# Patient Record
Sex: FEMALE | Race: BLACK OR AFRICAN AMERICAN | NOT HISPANIC OR LATINO | Employment: OTHER | URBAN - METROPOLITAN AREA
[De-identification: names, ages, dates, MRNs, and addresses within clinical notes are randomized per-mention and may not be internally consistent; named-entity substitution may affect disease eponyms.]

---

## 2023-09-06 ENCOUNTER — HOSPITAL ENCOUNTER (OUTPATIENT)
Dept: DATA CONVERSION | Facility: HOSPITAL | Age: 68
Discharge: HOME | End: 2023-09-06
Payer: MEDICARE

## 2023-09-06 DIAGNOSIS — Z12.31 ENCOUNTER FOR SCREENING MAMMOGRAM FOR MALIGNANT NEOPLASM OF BREAST: ICD-10-CM

## 2023-09-06 DIAGNOSIS — N95.9 UNSPECIFIED MENOPAUSAL AND PERIMENOPAUSAL DISORDER: ICD-10-CM

## 2023-09-06 DIAGNOSIS — E03.9 HYPOTHYROIDISM, UNSPECIFIED: ICD-10-CM

## 2023-09-06 DIAGNOSIS — E55.9 VITAMIN D DEFICIENCY, UNSPECIFIED: ICD-10-CM

## 2023-09-06 DIAGNOSIS — R73.9 HYPERGLYCEMIA, UNSPECIFIED: ICD-10-CM

## 2023-09-06 DIAGNOSIS — Z01.89 ENCOUNTER FOR OTHER SPECIFIED SPECIAL EXAMINATIONS: ICD-10-CM

## 2023-09-06 DIAGNOSIS — E78.00 PURE HYPERCHOLESTEROLEMIA, UNSPECIFIED: ICD-10-CM

## 2023-09-06 DIAGNOSIS — Z11.59 ENCOUNTER FOR SCREENING FOR OTHER VIRAL DISEASES: ICD-10-CM

## 2023-09-06 DIAGNOSIS — R39.9 UNSPECIFIED SYMPTOMS AND SIGNS INVOLVING THE GENITOURINARY SYSTEM: ICD-10-CM

## 2023-09-06 LAB
25(OH)D3 SERPL-MCNC: 39 NG/ML (ref 31–100)
ALBUMIN SERPL-MCNC: 4.2 GM/DL (ref 3.5–5)
ALBUMIN/GLOB SERPL: 1.8 RATIO (ref 1.5–3)
ALP BLD-CCNC: 64 U/L (ref 35–125)
ALT SERPL-CCNC: 14 U/L (ref 5–40)
ANION GAP SERPL CALCULATED.3IONS-SCNC: 11 MMOL/L (ref 0–19)
APPEARANCE PLAS: NORMAL
AST SERPL-CCNC: 13 U/L (ref 5–40)
BACTERIA UR QL AUTO: NEGATIVE
BASOPHILS # BLD AUTO: 0.02 K/UL (ref 0–0.22)
BASOPHILS NFR BLD AUTO: 0.4 % (ref 0–1)
BILIRUB DIRECT SERPL-MCNC: 0.3 MG/DL (ref 0–0.2)
BILIRUB INDIRECT SERPL-MCNC: 1.1 MG/DL (ref 0–0.8)
BILIRUB SERPL-MCNC: 1.4 MG/DL (ref 0.1–1.2)
BILIRUB UR QL STRIP.AUTO: NEGATIVE
BUN SERPL-MCNC: 13 MG/DL (ref 8–25)
BUN/CREAT SERPL: 14.4 RATIO (ref 8–21)
CALCIUM SERPL-MCNC: 9.8 MG/DL (ref 8.5–10.4)
CHLORIDE SERPL-SCNC: 111 MMOL/L (ref 97–107)
CHOLEST SERPL-MCNC: 147 MG/DL (ref 133–200)
CHOLEST/HDLC SERPL: 2.5 RATIO
CLARITY UR: CLEAR
CO2 SERPL-SCNC: 24 MMOL/L (ref 24–31)
COLOR SPUN FLD: NORMAL
COLOR UR: YELLOW
CREAT SERPL-MCNC: 0.9 MG/DL (ref 0.4–1.6)
DEPRECATED RDW RBC AUTO: 40.1 FL (ref 37–54)
DIFFERENTIAL METHOD BLD: ABNORMAL
EOSINOPHIL # BLD AUTO: 0.03 K/UL (ref 0–0.45)
EOSINOPHIL NFR BLD: 0.6 % (ref 0–3)
ERYTHROCYTE [DISTWIDTH] IN BLOOD BY AUTOMATED COUNT: 13.2 % (ref 11.7–15)
FASTING STATUS PATIENT QL REPORTED: NORMAL
GFR SERPL CREATININE-BSD FRML MDRD: 71 ML/MIN/1.73 M2
GLOBULIN SER-MCNC: 2.3 G/DL (ref 1.9–3.7)
GLUCOSE SERPL-MCNC: 81 MG/DL (ref 65–99)
GLUCOSE UR STRIP.AUTO-MCNC: NEGATIVE MG/DL
HBA1C MFR BLD: 5.1 % (ref 4–6)
HCT VFR BLD AUTO: 35.3 % (ref 36–44)
HDLC SERPL-MCNC: 58 MG/DL
HGB BLD-MCNC: 12.5 GM/DL (ref 12–15)
HGB UR QL STRIP.AUTO: 2 /HPF (ref 0–3)
HGB UR QL: NEGATIVE
HYALINE CASTS UR QL AUTO: 3 /LPF
IMM GRANULOCYTES # BLD AUTO: 0.01 K/UL (ref 0–0.1)
KETONES UR QL STRIP.AUTO: ABNORMAL
LDLC SERPL CALC-MCNC: 75 MG/DL (ref 65–130)
LEUKOCYTE ESTERASE UR QL STRIP.AUTO: NEGATIVE
LYMPHOCYTES # BLD AUTO: 2.63 K/UL (ref 1.2–3.2)
LYMPHOCYTES NFR BLD MANUAL: 51.5 % (ref 20–40)
MCH RBC QN AUTO: 29.4 PG (ref 26–34)
MCHC RBC AUTO-ENTMCNC: 35.4 % (ref 31–37)
MCV RBC AUTO: 83.1 FL (ref 80–100)
MICROSCOPIC (UA): ABNORMAL
MONOCYTES # BLD AUTO: 0.42 K/UL (ref 0–0.8)
MONOCYTES NFR BLD MANUAL: 8.2 % (ref 0–8)
NEUTROPHILS # BLD AUTO: 2 K/UL
NEUTROPHILS # BLD AUTO: 2 K/UL (ref 1.8–7.7)
NEUTROPHILS.IMMATURE NFR BLD: 0.2 % (ref 0–1)
NEUTS SEG NFR BLD: 39.1 % (ref 50–70)
NITRITE UR QL STRIP.AUTO: NEGATIVE
NRBC BLD-RTO: 0 /100 WBC
PH UR STRIP.AUTO: 5.5 [PH] (ref 4.6–8)
PLATELET # BLD AUTO: 214 K/UL (ref 150–450)
PMV BLD AUTO: 10.2 CU (ref 7–12.6)
POTASSIUM SERPL-SCNC: 4.1 MMOL/L (ref 3.4–5.1)
PROT SERPL-MCNC: 6.5 G/DL (ref 5.9–7.9)
PROT UR STRIP.AUTO-MCNC: ABNORMAL MG/DL
RBC # BLD AUTO: 4.25 M/UL (ref 4–4.9)
SODIUM SERPL-SCNC: 146 MMOL/L (ref 133–145)
SP GR UR STRIP.AUTO: 1.03 (ref 1–1.03)
SQUAMOUS UR QL AUTO: ABNORMAL /HPF
TRIGL SERPL-MCNC: 71 MG/DL (ref 40–150)
TSH SERPL DL<=0.05 MIU/L-ACNC: 1.12 MIU/L (ref 0.27–4.2)
UROBILINOGEN UR QL STRIP.AUTO: NORMAL MG/DL (ref 0–1)
WBC # BLD AUTO: 5.1 K/UL (ref 4.5–11)
WBC #/AREA URNS AUTO: 1 /HPF (ref 0–3)

## 2023-09-07 LAB — HCV AB SER QL: NORMAL

## 2023-09-23 PROBLEM — K90.9 ABNORMAL INTESTINAL ABSORPTION (HHS-HCC): Status: ACTIVE | Noted: 2023-09-23

## 2023-09-23 PROBLEM — N95.9 UNSPECIFIED MENOPAUSAL AND PERIMENOPAUSAL DISORDER: Status: ACTIVE | Noted: 2023-09-23

## 2023-09-23 PROBLEM — R40.0 DAYTIME SOMNOLENCE: Status: ACTIVE | Noted: 2023-09-23

## 2023-09-23 PROBLEM — F41.9 ANXIETY: Status: ACTIVE | Noted: 2023-09-23

## 2023-09-23 PROBLEM — I10 BENIGN ESSENTIAL HTN: Status: ACTIVE | Noted: 2023-09-23

## 2023-09-23 PROBLEM — K57.30 DIVERTICULOSIS OF LARGE INTESTINE: Status: ACTIVE | Noted: 2023-09-23

## 2023-09-23 PROBLEM — E78.5 HYPERLIPIDEMIA: Status: ACTIVE | Noted: 2023-09-23

## 2023-09-23 PROBLEM — K29.30 CHRONIC SUPERFICIAL GASTRITIS: Status: ACTIVE | Noted: 2023-09-23

## 2023-09-23 PROBLEM — K22.2 ESOPHAGEAL STRICTURE: Status: ACTIVE | Noted: 2023-09-23

## 2023-09-23 PROBLEM — E21.3 HYPERPARATHYROIDISM (MULTI): Status: ACTIVE | Noted: 2023-09-23

## 2023-09-23 PROBLEM — G47.30 SLEEP APNEA: Status: ACTIVE | Noted: 2023-09-23

## 2023-09-23 PROBLEM — F33.1 MODERATE EPISODE OF RECURRENT MAJOR DEPRESSIVE DISORDER (MULTI): Status: ACTIVE | Noted: 2023-09-23

## 2023-09-23 PROBLEM — E66.01 MORBID (SEVERE) OBESITY DUE TO EXCESS CALORIES (MULTI): Status: ACTIVE | Noted: 2023-09-23

## 2023-09-23 PROBLEM — Z98.82 HISTORY OF BREAST IMPLANT: Status: ACTIVE | Noted: 2023-09-23

## 2023-09-23 PROBLEM — E03.9 HYPOTHYROIDISM: Status: ACTIVE | Noted: 2023-09-23

## 2023-09-23 PROBLEM — K21.9 GASTROESOPHAGEAL REFLUX DISEASE WITH HIATAL HERNIA: Status: ACTIVE | Noted: 2023-09-23

## 2023-09-23 PROBLEM — F41.8 MIXED ANXIETY DEPRESSIVE DISORDER: Status: ACTIVE | Noted: 2023-09-23

## 2023-09-23 PROBLEM — E78.00 PURE HYPERCHOLESTEROLEMIA: Status: ACTIVE | Noted: 2023-09-23

## 2023-09-23 PROBLEM — K44.9 GASTROESOPHAGEAL REFLUX DISEASE WITH HIATAL HERNIA: Status: ACTIVE | Noted: 2023-09-23

## 2023-09-23 PROBLEM — E55.9 VITAMIN D DEFICIENCY: Status: ACTIVE | Noted: 2023-09-23

## 2023-09-23 PROBLEM — Q79.1: Status: ACTIVE | Noted: 2023-09-23

## 2023-09-23 PROBLEM — K44.9 DIAPHRAGMATIC HERNIA WITHOUT OBSTRUCTION OR GANGRENE: Status: ACTIVE | Noted: 2023-09-23

## 2023-09-23 PROBLEM — R13.10 DYSPHAGIA: Status: ACTIVE | Noted: 2023-09-23

## 2023-09-23 RX ORDER — OXYCODONE HCL 5 MG/5 ML
5 SOLUTION, ORAL ORAL EVERY 6 HOURS
COMMUNITY
End: 2024-03-25 | Stop reason: WASHOUT

## 2023-09-23 RX ORDER — SEMAGLUTIDE 1.34 MG/ML
0.5 INJECTION, SOLUTION SUBCUTANEOUS
COMMUNITY
End: 2024-03-25 | Stop reason: WASHOUT

## 2023-09-23 RX ORDER — BUPROPION HYDROCHLORIDE 75 MG/1
1 TABLET ORAL 2 TIMES DAILY
COMMUNITY
End: 2024-04-05 | Stop reason: SDUPTHER

## 2023-09-23 RX ORDER — SEMAGLUTIDE 0.68 MG/ML
0.5 INJECTION, SOLUTION SUBCUTANEOUS
COMMUNITY
Start: 2023-08-29

## 2023-09-23 RX ORDER — HYDROCHLOROTHIAZIDE 12.5 MG/1
1 CAPSULE ORAL EVERY MORNING
COMMUNITY
End: 2024-03-25 | Stop reason: WASHOUT

## 2023-09-23 RX ORDER — IBUPROFEN 200 MG
3 TABLET ORAL EVERY MORNING
COMMUNITY
End: 2024-03-25 | Stop reason: WASHOUT

## 2023-09-23 RX ORDER — OMEPRAZOLE 40 MG/1
1 CAPSULE, DELAYED RELEASE ORAL
COMMUNITY
Start: 2023-07-31 | End: 2024-03-25 | Stop reason: WASHOUT

## 2023-09-23 RX ORDER — ACETAMINOPHEN 160 MG/5ML
LIQUID ORAL
COMMUNITY
End: 2024-03-25 | Stop reason: WASHOUT

## 2023-09-23 RX ORDER — ACETAMINOPHEN 500 MG
2 TABLET ORAL DAILY
COMMUNITY

## 2023-09-23 RX ORDER — BUPROPION HCL 150 MG
1 TABLET, EXTENDED RELEASE 24 HR ORAL EVERY MORNING
COMMUNITY
End: 2023-10-10 | Stop reason: SDUPTHER

## 2023-09-23 RX ORDER — PANTOPRAZOLE SODIUM 40 MG/1
1 TABLET, DELAYED RELEASE ORAL
COMMUNITY
End: 2024-03-25 | Stop reason: WASHOUT

## 2023-09-23 RX ORDER — ACETAMINOPHEN 500 MG
1 TABLET ORAL DAILY
COMMUNITY

## 2023-09-25 ENCOUNTER — HOSPITAL ENCOUNTER (OUTPATIENT)
Dept: DATA CONVERSION | Facility: HOSPITAL | Age: 68
Discharge: HOME | End: 2023-09-25
Payer: MEDICARE

## 2023-09-25 DIAGNOSIS — E21.3 HYPERPARATHYROIDISM, UNSPECIFIED (MULTI): ICD-10-CM

## 2023-09-25 DIAGNOSIS — E55.9 VITAMIN D DEFICIENCY, UNSPECIFIED: ICD-10-CM

## 2023-09-25 DIAGNOSIS — K90.9 INTESTINAL MALABSORPTION, UNSPECIFIED (HHS-HCC): ICD-10-CM

## 2023-09-25 DIAGNOSIS — E53.8 DEFICIENCY OF OTHER SPECIFIED B GROUP VITAMINS: ICD-10-CM

## 2023-09-25 LAB
25(OH)D3 SERPL-MCNC: 44 NG/ML (ref 31–100)
ALBUMIN SERPL-MCNC: 4.1 GM/DL (ref 3.5–5)
ALBUMIN/GLOB SERPL: 1.5 RATIO (ref 1.5–3)
ALP BLD-CCNC: 66 U/L (ref 35–125)
ALT SERPL-CCNC: 18 U/L (ref 5–40)
ANION GAP SERPL CALCULATED.3IONS-SCNC: 8 MMOL/L (ref 0–19)
AST SERPL-CCNC: 16 U/L (ref 5–40)
BASOPHILS # BLD AUTO: 0.02 K/UL (ref 0–0.22)
BASOPHILS NFR BLD AUTO: 0.5 % (ref 0–1)
BILIRUB SERPL-MCNC: 1.3 MG/DL (ref 0.1–1.2)
BUN SERPL-MCNC: 10 MG/DL (ref 8–25)
BUN/CREAT SERPL: 10 RATIO (ref 8–21)
CALCIUM SERPL-MCNC: 9.6 MG/DL (ref 8.5–10.4)
CHLORIDE SERPL-SCNC: 107 MMOL/L (ref 97–107)
CO2 SERPL-SCNC: 26 MMOL/L (ref 24–31)
CREAT SERPL-MCNC: 1 MG/DL (ref 0.4–1.6)
DEPRECATED RDW RBC AUTO: 40.2 FL (ref 37–54)
DIFFERENTIAL METHOD BLD: ABNORMAL
EOSINOPHIL # BLD AUTO: 0.07 K/UL (ref 0–0.45)
EOSINOPHIL NFR BLD: 1.7 % (ref 0–3)
ERYTHROCYTE [DISTWIDTH] IN BLOOD BY AUTOMATED COUNT: 13.3 % (ref 11.7–15)
FERRITIN SERPL-MCNC: 216 NG/ML (ref 13–150)
FOLATE SERPL-MCNC: 16.8 NG/ML (ref 4.2–19.9)
GFR SERPL CREATININE-BSD FRML MDRD: 62 ML/MIN/1.73 M2
GLOBULIN SER-MCNC: 2.8 G/DL (ref 1.9–3.7)
GLUCOSE SERPL-MCNC: 78 MG/DL (ref 65–99)
HCT VFR BLD AUTO: 35.4 % (ref 36–44)
HGB BLD-MCNC: 12.4 GM/DL (ref 12–15)
IMM GRANULOCYTES # BLD AUTO: 0.01 K/UL (ref 0–0.1)
IRON SATN MFR SERPL: 34.5 % (ref 12–50)
IRON SERPL-MCNC: 90 UG/DL (ref 30–160)
LYMPHOCYTES # BLD AUTO: 2.21 K/UL (ref 1.2–3.2)
LYMPHOCYTES NFR BLD MANUAL: 54.6 % (ref 20–40)
MCH RBC QN AUTO: 29 PG (ref 26–34)
MCHC RBC AUTO-ENTMCNC: 35 % (ref 31–37)
MCV RBC AUTO: 82.9 FL (ref 80–100)
MONOCYTES # BLD AUTO: 0.33 K/UL (ref 0–0.8)
MONOCYTES NFR BLD MANUAL: 8.1 % (ref 0–8)
NEUTROPHILS # BLD AUTO: 1.41 K/UL
NEUTROPHILS # BLD AUTO: 1.41 K/UL (ref 1.8–7.7)
NEUTROPHILS.IMMATURE NFR BLD: 0.2 % (ref 0–1)
NEUTS SEG NFR BLD: 34.9 % (ref 50–70)
NRBC BLD-RTO: 0 /100 WBC
PHOSPHATE SERPL-MCNC: 3.5 MG/DL (ref 2.5–4.5)
PLATELET # BLD AUTO: 209 K/UL (ref 150–450)
PMV BLD AUTO: 10.1 CU (ref 7–12.6)
POTASSIUM SERPL-SCNC: 4.1 MMOL/L (ref 3.4–5.1)
PROT SERPL-MCNC: 6.9 G/DL (ref 5.9–7.9)
RBC # BLD AUTO: 4.27 M/UL (ref 4–4.9)
SODIUM SERPL-SCNC: 141 MMOL/L (ref 133–145)
TIBC SERPL-MCNC: 261 UG/DL (ref 228–428)
VIT B12 SERPL-MCNC: 1112 PG/ML (ref 211–946)
WBC # BLD AUTO: 4.1 K/UL (ref 4.5–11)

## 2023-09-28 LAB
COPPER SERPL-MCNC: NORMAL UG/DL
ZINC SERPL-MCNC: NORMAL UG/ML

## 2023-10-02 LAB
PTH-INTACT SERPL-MCNC: NORMAL PG/ML (ref 15–65)
VIT B1 BLD-MCNC: NORMAL UG/DL

## 2023-10-10 ENCOUNTER — TELEPHONE (OUTPATIENT)
Dept: PRIMARY CARE | Facility: CLINIC | Age: 68
End: 2023-10-10
Payer: MEDICARE

## 2023-10-10 DIAGNOSIS — F41.8 ANXIETY ASSOCIATED WITH DEPRESSION: ICD-10-CM

## 2023-10-10 RX ORDER — CHOLECALCIFEROL (VITAMIN D3) 25 MCG
TABLET,CHEWABLE ORAL
COMMUNITY

## 2023-10-10 RX ORDER — BUPROPION HYDROCHLORIDE 75 MG/1
75 TABLET ORAL 2 TIMES DAILY
Qty: 180 TABLET | Refills: 1 | Status: CANCELLED | OUTPATIENT
Start: 2023-10-10 | End: 2024-01-08

## 2023-10-10 RX ORDER — CALCIUM CARBONATE 600 MG
TABLET ORAL EVERY 12 HOURS
COMMUNITY
End: 2023-12-28 | Stop reason: WASHOUT

## 2023-12-21 ENCOUNTER — LAB (OUTPATIENT)
Dept: LAB | Facility: LAB | Age: 68
End: 2023-12-21
Payer: MEDICARE

## 2023-12-21 DIAGNOSIS — E80.6 OTHER DISORDERS OF BILIRUBIN METABOLISM: Primary | ICD-10-CM

## 2023-12-21 LAB — BILIRUB SERPL-MCNC: 1.9 MG/DL (ref 0.1–1.2)

## 2023-12-21 PROCEDURE — 82247 BILIRUBIN TOTAL: CPT

## 2023-12-21 PROCEDURE — 36415 COLL VENOUS BLD VENIPUNCTURE: CPT

## 2023-12-22 DIAGNOSIS — E55.9 VITAMIN D DEFICIENCY: ICD-10-CM

## 2023-12-22 DIAGNOSIS — E21.3 HYPERPARATHYROIDISM (MULTI): Primary | ICD-10-CM

## 2023-12-22 DIAGNOSIS — K90.9 ABNORMAL INTESTINAL ABSORPTION (HHS-HCC): ICD-10-CM

## 2023-12-22 DIAGNOSIS — E53.8 VITAMIN B12 DEFICIENCY: ICD-10-CM

## 2023-12-27 ENCOUNTER — LAB (OUTPATIENT)
Dept: LAB | Facility: LAB | Age: 68
End: 2023-12-27
Payer: MEDICARE

## 2023-12-27 DIAGNOSIS — E53.8 VITAMIN B12 DEFICIENCY: ICD-10-CM

## 2023-12-27 DIAGNOSIS — E21.3 HYPERPARATHYROIDISM (MULTI): ICD-10-CM

## 2023-12-27 DIAGNOSIS — E55.9 VITAMIN D DEFICIENCY: ICD-10-CM

## 2023-12-27 DIAGNOSIS — K90.9 ABNORMAL INTESTINAL ABSORPTION (HHS-HCC): ICD-10-CM

## 2023-12-27 PROBLEM — Z98.84 H/O BARIATRIC SURGERY: Status: ACTIVE | Noted: 2023-12-27

## 2023-12-27 LAB
ALBUMIN SERPL-MCNC: 4.3 G/DL (ref 3.5–5)
ALP BLD-CCNC: 81 U/L (ref 35–125)
ALT SERPL-CCNC: 20 U/L (ref 5–40)
ANION GAP SERPL CALC-SCNC: 15 MMOL/L
AST SERPL-CCNC: 18 U/L (ref 5–40)
BASOPHILS # BLD AUTO: 0.02 X10*3/UL (ref 0–0.1)
BASOPHILS NFR BLD AUTO: 0.5 %
BILIRUB SERPL-MCNC: 1.7 MG/DL (ref 0.1–1.2)
BUN SERPL-MCNC: 7 MG/DL (ref 8–25)
CALCIUM SERPL-MCNC: 9.6 MG/DL (ref 8.5–10.4)
CHLORIDE SERPL-SCNC: 105 MMOL/L (ref 97–107)
CO2 SERPL-SCNC: 23 MMOL/L (ref 24–31)
CREAT SERPL-MCNC: 0.9 MG/DL (ref 0.4–1.6)
EOSINOPHIL # BLD AUTO: 0.06 X10*3/UL (ref 0–0.7)
EOSINOPHIL NFR BLD AUTO: 1.5 %
ERYTHROCYTE [DISTWIDTH] IN BLOOD BY AUTOMATED COUNT: 13.5 % (ref 11.5–14.5)
FERRITIN SERPL-MCNC: 230 NG/ML (ref 13–150)
FOLATE SERPL-MCNC: >20 NG/ML (ref 4.2–19.9)
GFR SERPL CREATININE-BSD FRML MDRD: 71 ML/MIN/1.73M*2
GLUCOSE SERPL-MCNC: 75 MG/DL (ref 65–99)
HCT VFR BLD AUTO: 36.1 % (ref 36–46)
HGB BLD-MCNC: 12.5 G/DL (ref 12–16)
IMM GRANULOCYTES # BLD AUTO: 0.01 X10*3/UL (ref 0–0.7)
IMM GRANULOCYTES NFR BLD AUTO: 0.3 % (ref 0–0.9)
IRON SATN MFR SERPL: 34 % (ref 12–50)
IRON SERPL-MCNC: 93 UG/DL (ref 30–160)
LYMPHOCYTES # BLD AUTO: 2.25 X10*3/UL (ref 1.2–4.8)
LYMPHOCYTES NFR BLD AUTO: 57.3 %
MCH RBC QN AUTO: 28.2 PG (ref 26–34)
MCHC RBC AUTO-ENTMCNC: 34.6 G/DL (ref 32–36)
MCV RBC AUTO: 82 FL (ref 80–100)
MONOCYTES # BLD AUTO: 0.29 X10*3/UL (ref 0.1–1)
MONOCYTES NFR BLD AUTO: 7.4 %
NEUTROPHILS # BLD AUTO: 1.3 X10*3/UL (ref 1.2–7.7)
NEUTROPHILS NFR BLD AUTO: 33 %
NRBC BLD-RTO: 0 /100 WBCS (ref 0–0)
PLATELET # BLD AUTO: 197 X10*3/UL (ref 150–450)
POTASSIUM SERPL-SCNC: 4.5 MMOL/L (ref 3.4–5.1)
PROT SERPL-MCNC: 6.5 G/DL (ref 5.9–7.9)
RBC # BLD AUTO: 4.43 X10*6/UL (ref 4–5.2)
SODIUM SERPL-SCNC: 143 MMOL/L (ref 133–145)
TIBC SERPL-MCNC: 275 UG/DL (ref 228–428)
UIBC SERPL-MCNC: 182 UG/DL (ref 110–370)
VIT B12 SERPL-MCNC: 664 PG/ML (ref 211–946)
WBC # BLD AUTO: 3.9 X10*3/UL (ref 4.4–11.3)

## 2023-12-27 PROCEDURE — 82525 ASSAY OF COPPER: CPT

## 2023-12-27 PROCEDURE — 83550 IRON BINDING TEST: CPT

## 2023-12-27 PROCEDURE — 85025 COMPLETE CBC W/AUTO DIFF WBC: CPT

## 2023-12-27 PROCEDURE — 82652 VIT D 1 25-DIHYDROXY: CPT

## 2023-12-27 PROCEDURE — 82607 VITAMIN B-12: CPT

## 2023-12-27 PROCEDURE — 36415 COLL VENOUS BLD VENIPUNCTURE: CPT

## 2023-12-27 PROCEDURE — 82728 ASSAY OF FERRITIN: CPT

## 2023-12-27 PROCEDURE — 83970 ASSAY OF PARATHORMONE: CPT

## 2023-12-27 PROCEDURE — 84425 ASSAY OF VITAMIN B-1: CPT

## 2023-12-27 PROCEDURE — 84630 ASSAY OF ZINC: CPT

## 2023-12-27 PROCEDURE — 83540 ASSAY OF IRON: CPT

## 2023-12-27 PROCEDURE — 80053 COMPREHEN METABOLIC PANEL: CPT

## 2023-12-27 PROCEDURE — 82746 ASSAY OF FOLIC ACID SERUM: CPT

## 2023-12-27 NOTE — PROGRESS NOTES
6 MOS FUV RYGB/ HHR  LAB REMINDER PROVIDED/ PATIENT STATES THT SHE WILL GO ON 12/27 TO GET LABS DONE. SD  START WT: 276   IDEAL WT:  150 START EXCESS: 126 TOTAL WT LOSS: 100 EWL:   79  % HT: 65 today wt: 176 lb  Patient admits to having excessive thirst.   Bariatric Surgery F/U:          Seen at ER after surgery? No.  Hospital admission? No.  Bariatric reoperation? No.  Bariatric intervention? No.  Was anticoagulation initiated for presumed/confirmed Vein Thrombosis/PE? No.  Was an incisional hernia noted on exam? No.  Sleep Apnea? No.  Still using C-PAP? No.  GERD req. meds? No.  Hyperlipidemia? No.  Still taking Cholesterol med? No.  Hypertension? No.  Still taking HTN med? No.  Number of Anti-hypertensive Medications: 0.  Diabetes? No.  Still taking DM med? No.  Current DM medication type: _____.         CONSTITUTIONAL:          Chills No.  Fatigue No.  Fever No.         CARDIOLOGY:          Negative for dizziness, chest pain, palpitations, shortness of breath.         RESPIRATORY:          Negative for chest congestion, cough, wheezing.         GASTROENTEROLOGY:          Food Intolerance yes, cake, sweets and fried food.  Acid reflux No.  Abdominal pain yes.  Black stools No.  Constipation No.  Diarrhea No.  Loss of appetite no.  Nausea No.  Vomiting No.         UROLOGY:          Negative for dysuria, urinary urgency, kidney stones.         PSYCHOLOGY:          Negative for depression, anxiety, high stress level.

## 2023-12-27 NOTE — PROGRESS NOTES
"Subjective   Patient ID: Krystina Ortez is a 68 y.o. female who presents for Follow-up (6 fuv rygb).  ELISE Flaherty is here for her 6 month follow up from RYGB. She feels great. She has lost 79% of her excess weight. She has no reflux or dysphagia. She denies reflux. She feels full with 1 cup of food. She is excercising every day-either water aerobics or walking. She plans on starting weight training after the new year. She takes women's MVI from [employer] and calcium citrate two tablets once per day. She does not have a gallbladder.     Objective   Physical Exam  Constitutional:       Appearance: Normal appearance.   Eyes:      Pupils: Pupils are equal, round, and reactive to light.   Cardiovascular:      Rate and Rhythm: Normal rate.   Pulmonary:      Effort: Pulmonary effort is normal.   Abdominal:      Palpations: Abdomen is soft.   Musculoskeletal:         General: Normal range of motion.   Skin:     General: Skin is warm and dry.   Neurological:      General: No focal deficit present.      Mental Status: She is alert and oriented to person, place, and time.   Psychiatric:         Mood and Affect: Mood normal.       .  Visit Vitals  /78 (BP Location: Left arm, Patient Position: Sitting)   Pulse 76   Ht 1.651 m (5' 5\")   Wt 79.8 kg (176 lb) Comment: START WT: 276   IDEAL WT:  150 START EXCESS: 126 TOTAL WT LOSS: 100 EWL:   79  % HT: 65 today wt: 176 lb   BMI 29.29 kg/m²   Smoking Status Never   BSA 1.91 m²        Latest Reference Range & Units 12/27/23 16:05   GLUCOSE 65 - 99 mg/dL 75   SODIUM 133 - 145 mmol/L 143   POTASSIUM 3.4 - 5.1 mmol/L 4.5   CHLORIDE 97 - 107 mmol/L 105   Bicarbonate 24 - 31 mmol/L 23 (L)   Anion Gap <=19 mmol/L 15   Blood Urea Nitrogen 8 - 25 mg/dL 7 (L)   Creatinine 0.40 - 1.60 mg/dL 0.90   EGFR >60 mL/min/1.73m*2 71   Calcium 8.5 - 10.4 mg/dL 9.6   Albumin 3.5 - 5.0 g/dL 4.3   Alkaline Phosphatase 35 - 125 U/L 81   ALT 5 - 40 U/L 20   AST 5 - 40 U/L 18   Bilirubin Total 0.1 - 1.2 mg/dL " 1.7 (H)   FERRITIN 13 - 150 ng/mL 230 (H)   FOLATE 4.2 - 19.9 ng/mL >20.0 (H)   Total Protein 5.9 - 7.9 g/dL 6.5   IRON 30 - 160 ug/dL 93   TIBC 228 - 428 ug/dL 275   UIBC 110 - 370 ug/dL 182   % Saturation 12 - 50 % 34   Vitamin B12 211 - 946 pg/mL 664   Parathyroid Hormone, Intact 18.5 - 88.0 pg/mL 46.5   WBC 4.4 - 11.3 x10*3/uL 3.9 (L)   nRBC 0.0 - 0.0 /100 WBCs 0.0   RBC 4.00 - 5.20 x10*6/uL 4.43   HEMOGLOBIN 12.0 - 16.0 g/dL 12.5   HEMATOCRIT 36.0 - 46.0 % 36.1   MCV 80 - 100 fL 82   MCH 26.0 - 34.0 pg 28.2   MCHC 32.0 - 36.0 g/dL 34.6   RED CELL DISTRIBUTION WIDTH 11.5 - 14.5 % 13.5   Platelets 150 - 450 x10*3/uL 197   Neutrophils % 40.0 - 80.0 % 33.0   Immature Granulocytes %, Automated 0.0 - 0.9 % 0.3   Lymphocytes % 13.0 - 44.0 % 57.3   Monocytes % 2.0 - 10.0 % 7.4   Eosinophils % 0.0 - 6.0 % 1.5   Basophils % 0.0 - 2.0 % 0.5   Neutrophils Absolute 1.20 - 7.70 x10*3/uL 1.30   Immature Granulocytes Absolute, Automated 0.00 - 0.70 x10*3/uL 0.01   Lymphocytes Absolute 1.20 - 4.80 x10*3/uL 2.25   Monocytes Absolute 0.10 - 1.00 x10*3/uL 0.29   Eosinophils Absolute 0.00 - 0.70 x10*3/uL 0.06   Basophils Absolute 0.00 - 0.10 x10*3/uL 0.02       Assessment/Plan   Problem List Items Addressed This Visit             ICD-10-CM    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    Relevant Orders    CBC and Auto Differential    Comprehensive Metabolic Panel    Copper, Blood    Ferritin    Folate    Iron and TIBC    Parathyroid Hormone, Intact    Vitamin B1, Whole Blood    Vitamin B12    Vitamin D 25-Hydroxy,Total (for eval of Vitamin D levels)    Zinc, Serum or Plasma    Hyperparathyroidism (CMS/HCC) E21.3    Relevant Orders    Parathyroid Hormone, Intact    Vitamin D deficiency E55.9    Relevant Orders    Vitamin D 25-Hydroxy,Total (for eval of Vitamin D levels)    Vitamin B12 deficiency E53.8    Relevant Orders    Vitamin B12    H/O bariatric surgery Z98.84     We reviewed the rules. I encouraged Krystina to continue  exercising and monitoring portions for continued weight loss and maintenance. I reminded her that a RYGB is not compatible with ASA or NSAID use.        Gemini Shelton, SHIVA-CNP 12/28/23 10:42 AM

## 2023-12-28 ENCOUNTER — OFFICE VISIT (OUTPATIENT)
Dept: SURGERY | Facility: CLINIC | Age: 68
End: 2023-12-28
Payer: MEDICARE

## 2023-12-28 VITALS
SYSTOLIC BLOOD PRESSURE: 122 MMHG | DIASTOLIC BLOOD PRESSURE: 78 MMHG | HEIGHT: 65 IN | HEART RATE: 76 BPM | WEIGHT: 176 LBS | BODY MASS INDEX: 29.32 KG/M2

## 2023-12-28 DIAGNOSIS — E53.8 VITAMIN B12 DEFICIENCY: ICD-10-CM

## 2023-12-28 DIAGNOSIS — E55.9 VITAMIN D DEFICIENCY: ICD-10-CM

## 2023-12-28 DIAGNOSIS — Z98.84 H/O BARIATRIC SURGERY: ICD-10-CM

## 2023-12-28 DIAGNOSIS — K90.9 ABNORMAL INTESTINAL ABSORPTION (HHS-HCC): Primary | ICD-10-CM

## 2023-12-28 DIAGNOSIS — E21.3 HYPERPARATHYROIDISM (MULTI): ICD-10-CM

## 2023-12-28 LAB — PTH-INTACT SERPL-MCNC: 46.5 PG/ML (ref 18.5–88)

## 2023-12-28 PROCEDURE — 1036F TOBACCO NON-USER: CPT

## 2023-12-28 PROCEDURE — 1159F MED LIST DOCD IN RCRD: CPT

## 2023-12-28 PROCEDURE — 3078F DIAST BP <80 MM HG: CPT

## 2023-12-28 PROCEDURE — 99214 OFFICE O/P EST MOD 30 MIN: CPT

## 2023-12-28 PROCEDURE — 3074F SYST BP LT 130 MM HG: CPT

## 2023-12-28 PROCEDURE — 1126F AMNT PAIN NOTED NONE PRSNT: CPT

## 2023-12-28 PROCEDURE — 1160F RVW MEDS BY RX/DR IN RCRD: CPT

## 2023-12-28 RX ORDER — BISMUTH SUBSALICYLATE 262 MG
1 TABLET,CHEWABLE ORAL DAILY
COMMUNITY

## 2023-12-28 RX ORDER — IBUPROFEN 200 MG
1 CAPSULE ORAL DAILY
COMMUNITY

## 2023-12-28 ASSESSMENT — PAIN SCALES - GENERAL: PAINLEVEL: 0-NO PAIN

## 2023-12-28 ASSESSMENT — PATIENT HEALTH QUESTIONNAIRE - PHQ9
1. LITTLE INTEREST OR PLEASURE IN DOING THINGS: NOT AT ALL
2. FEELING DOWN, DEPRESSED OR HOPELESS: NOT AT ALL
SUM OF ALL RESPONSES TO PHQ9 QUESTIONS 1 AND 2: 0

## 2023-12-28 ASSESSMENT — LIFESTYLE VARIABLES
SKIP TO QUESTIONS 9-10: 1
AUDIT-C TOTAL SCORE: 1
HOW OFTEN DO YOU HAVE SIX OR MORE DRINKS ON ONE OCCASION: NEVER
HOW MANY STANDARD DRINKS CONTAINING ALCOHOL DO YOU HAVE ON A TYPICAL DAY: 1 OR 2
HOW OFTEN DO YOU HAVE A DRINK CONTAINING ALCOHOL: MONTHLY OR LESS

## 2023-12-28 ASSESSMENT — ENCOUNTER SYMPTOMS
OCCASIONAL FEELINGS OF UNSTEADINESS: 0
LOSS OF SENSATION IN FEET: 0
DEPRESSION: 0

## 2023-12-28 ASSESSMENT — COLUMBIA-SUICIDE SEVERITY RATING SCALE - C-SSRS
2. HAVE YOU ACTUALLY HAD ANY THOUGHTS OF KILLING YOURSELF?: NO
6. HAVE YOU EVER DONE ANYTHING, STARTED TO DO ANYTHING, OR PREPARED TO DO ANYTHING TO END YOUR LIFE?: NO
1. IN THE PAST MONTH, HAVE YOU WISHED YOU WERE DEAD OR WISHED YOU COULD GO TO SLEEP AND NOT WAKE UP?: NO

## 2023-12-29 LAB
1,25(OH)2D3 SERPL-MCNC: 53.5 PG/ML (ref 19.9–79.3)
COPPER SERPL-MCNC: 105.1 UG/DL (ref 80–155)
ZINC SERPL-MCNC: 67.4 UG/DL (ref 60–120)

## 2023-12-30 LAB — VIT B1 PYROPHOSHATE BLD-SCNC: 69 NMOL/L (ref 70–180)

## 2024-01-03 DIAGNOSIS — E51.9 THIAMINE DEFICIENCY: Primary | ICD-10-CM

## 2024-01-03 RX ORDER — LANOLIN ALCOHOL/MO/W.PET/CERES
50 CREAM (GRAM) TOPICAL DAILY
Qty: 45 TABLET | Refills: 3 | Status: SHIPPED | OUTPATIENT
Start: 2024-01-03 | End: 2024-03-25 | Stop reason: WASHOUT

## 2024-03-12 ENCOUNTER — APPOINTMENT (OUTPATIENT)
Dept: PRIMARY CARE | Facility: CLINIC | Age: 69
End: 2024-03-12
Payer: MEDICARE

## 2024-03-24 NOTE — PROGRESS NOTES
[facility]: [facility]  PROGRESS NOTE      Krystina Ortez is a 68 y.o. female that is presenting today for No chief complaint on file..    Assessment/Plan   Diagnoses and all orders for this visit:  Benign essential HTN    Under control with diet / losing weight  Continue the same   Hyperparathyroidism (CMS/HCC)    Followed by Endo  Moderate episode of recurrent major depressive disorder (CMS/HCC)     Under control with current treatment   Continue Wellbutrin the dame  Neutropenia, unspecified type (CMS/HCC)    Being monitored / Continue same  Encounter for routine laboratory testing  -     Comprehensive Metabolic Panel; Future  -     CBC and Auto Differential; Future  -     Lipid Panel; Future  -     Hemoglobin A1C; Future  -     Vitamin D 25-Hydroxy,Total (for eval of Vitamin D levels); Future  -     TSH with reflex to Free T4 if abnormal; Future  Vitamin D deficiency  -     Vitamin D 25-Hydroxy,Total (for eval of Vitamin D levels); Future  Abnormal finding of blood chemistry, unspecified  -     CBC and Auto Differential; Future  -     Lipid Panel; Future  -     Hemoglobin A1C; Future  Other fatigue  -     TSH with reflex to Free T4 if abnormal; Future  Encounter for screening mammogram for breast cancer  -     BI mammo bilateral screening tomosynthesis; Future  Other orders  -     Follow Up In Primary Care; Future  Subjective   - Patient is here today for 6 months FU on HTN / A & D/ GERD,  refills  been doing well and no complaints    - Patient denies any symptoms or concerns at this time.    - patient denies any adverse reactions to or concerns with his/her meds.      Review of Systems   All pertinent POSITIVES as noted per HPI.  All other systems have been reviewed and are NEGATIVE and /or Noncontributory to this patient current visit or complaint.    Objective   There were no vitals filed for this visit.   There is no height or weight on file to calculate BMI.  Physical Exam  Vitals and  "nursing note reviewed.   Constitutional:       Appearance: Normal appearance.   HENT:      Head: Normocephalic and atraumatic.   Neck:      Vascular: No carotid bruit.   Cardiovascular:      Rate and Rhythm: Normal rate and regular rhythm.      Pulses: Normal pulses.      Heart sounds: Normal heart sounds.   Pulmonary:      Effort: Pulmonary effort is normal.      Breath sounds: Normal breath sounds.   Abdominal:      General: Abdomen is flat. Bowel sounds are normal.      Palpations: Abdomen is soft.   Musculoskeletal:         General: No swelling. Normal range of motion.      Cervical back: Neck supple.   Lymphadenopathy:      Cervical: No cervical adenopathy.   Skin:     General: Skin is warm and dry.   Neurological:      Mental Status: She is alert.   Psychiatric:         Mood and Affect: Mood normal.       Diagnostic Results   Lab Results   Component Value Date    GLUCOSE 75 12/27/2023    CALCIUM 9.6 12/27/2023     12/27/2023    K 4.5 12/27/2023    CO2 23 (L) 12/27/2023     12/27/2023    BUN 7 (L) 12/27/2023    CREATININE 0.90 12/27/2023     Lab Results   Component Value Date    ALT 20 12/27/2023    AST 18 12/27/2023    ALKPHOS 81 12/27/2023    BILITOT 1.7 (H) 12/27/2023     Lab Results   Component Value Date    WBC 3.9 (L) 12/27/2023    HGB 12.5 12/27/2023    HCT 36.1 12/27/2023    MCV 82 12/27/2023     12/27/2023     Lab Results   Component Value Date    CHOL 147 09/06/2023    CHOL 140 09/01/2022     Lab Results   Component Value Date    HDL 58 09/06/2023    HDL 50 (L) 09/01/2022     Lab Results   Component Value Date    LDLCALC 75 09/06/2023    LDLCALC 69 09/01/2022     Lab Results   Component Value Date    TRIG 71 09/06/2023    TRIG 106 09/01/2022     No components found for: \"CHOLHDL\"  Lab Results   Component Value Date    HGBA1C 5.1 09/06/2023     Other labs not included in the list above were reviewed either before or during this encounter.    History    No past medical history on " file.  No past surgical history on file.  Family History   Problem Relation Name Age of Onset    Hypertension Mother      Other (heat stroke) Father      GI problems Father      Hypertension Sister      Lung disease Sister      Mental illness Sister      Other (nodules of vocal cords) Sister       Social History     Socioeconomic History    Marital status:      Spouse name: Not on file    Number of children: Not on file    Years of education: Not on file    Highest education level: Not on file   Occupational History    Not on file   Tobacco Use    Smoking status: Never    Smokeless tobacco: Never   Substance and Sexual Activity    Alcohol use: Yes    Drug use: Never    Sexual activity: Not on file   Other Topics Concern    Not on file   Social History Narrative    Not on file     Social Determinants of Health     Financial Resource Strain: Not on file   Food Insecurity: Not on file   Transportation Needs: Not on file   Physical Activity: Not on file   Stress: Not on file   Social Connections: Not on file   Intimate Partner Violence: Not on file   Housing Stability: Not on file     No Known Allergies  Current Outpatient Medications on File Prior to Visit   Medication Sig Dispense Refill    acetaminophen (Tylenol) 160 mg/5 mL elixir Take by mouth. As directed      acetaminophen (Tylenol) 500 mg tablet Take 2 tablets (1,000 mg) by mouth once daily.      buPROPion (Wellbutrin) 75 mg tablet Take 1 tablet (75 mg) by mouth 2 times a day.      calcium citrate (Calcitrate) 200 mg (950 mg) tablet Take 1 tablet (200 mg) by mouth once daily.      cholecalciferol (Vitamin D-3) 5,000 Units tablet Take 1 tablet (5,000 Units) by mouth once daily.      cyanocobalamin, vitamin B-12, 3,000 mcg capsule as directed Sublingual      glucos sul 2KCl/msm/chond/C/Mn (GLUCOSAMINE CHONDROITIN ORAL) Take 2 tablets by mouth once every day.      hydroCHLOROthiazide (Microzide) 12.5 mg capsule Take 1 capsule (12.5 mg) by mouth once daily  in the morning.      ibuprofen 200 mg tablet Take 3 tablets (600 mg) by mouth once daily in the morning. 4 tablets at bedtime      multivitamin tablet Take 1 tablet by mouth once daily. Woman mvi      mv,alyce,min/iron/folic acid/lut (COMPLETE MULTI ORAL) Take by mouth. As directed      omeprazole (PriLOSEC) 40 mg DR capsule Take 1 capsule (40 mg) by mouth once daily in the morning. Take before meals. 30 minutes prior      oxyCODONE (Roxicodone) 5 mg/5 mL solution Take 5 mL (5 mg) by mouth every 6 hours.      Ozempic 0.25 mg or 0.5 mg (2 mg/3 mL) pen injector Inject 0.5 mg under the skin 1 (one) time per week.      Ozempic 0.25 mg or 0.5 mg(2 mg/1.5 mL) pen injector Inject 0.5 mg under the skin 1 (one) time per week.      pantoprazole (ProtoNix) 40 mg EC tablet Take 1 tablet (40 mg) by mouth once daily in the morning. Take before meals. 30 minutes prior      pedi mv no.227/ferrous sulfate (FLINTSTONES COMPLETE, FE SULF, ORAL) as directed Orally      thiamine (Vitamin B-1) 100 mg tablet Take 0.5 tablets (50 mg) by mouth once daily. 45 tablet 3     No current facility-administered medications on file prior to visit.     Immunization History   Administered Date(s) Administered    Flu vaccine, quadrivalent, high-dose, preservative free, age 65y+ (FLUZONE) 09/12/2023    Pfizer COVID-19 vaccine, bivalent, age 12 years and older (30 mcg/0.3 mL) 10/04/2022    Pfizer Purple Cap SARS-CoV-2 03/14/2021, 04/04/2021, 11/16/2021    Pneumococcal conjugate vaccine, 20-valent (PREVNAR 20) 08/16/2022    Tdap vaccine, age 7 year and older (BOOSTRIX, ADACEL) 08/16/2022     Patient's medical history was reviewed and updated either before or during this encounter.       Satnam Martel MD

## 2024-03-25 ENCOUNTER — OFFICE VISIT (OUTPATIENT)
Dept: PRIMARY CARE | Facility: CLINIC | Age: 69
End: 2024-03-25
Payer: MEDICARE

## 2024-03-25 VITALS
HEART RATE: 68 BPM | DIASTOLIC BLOOD PRESSURE: 70 MMHG | OXYGEN SATURATION: 98 % | SYSTOLIC BLOOD PRESSURE: 100 MMHG | TEMPERATURE: 97.3 F | BODY MASS INDEX: 28.79 KG/M2 | WEIGHT: 173 LBS

## 2024-03-25 DIAGNOSIS — I10 BENIGN ESSENTIAL HTN: Primary | ICD-10-CM

## 2024-03-25 DIAGNOSIS — Z12.31 ENCOUNTER FOR SCREENING MAMMOGRAM FOR BREAST CANCER: ICD-10-CM

## 2024-03-25 DIAGNOSIS — F33.1 MODERATE EPISODE OF RECURRENT MAJOR DEPRESSIVE DISORDER (MULTI): ICD-10-CM

## 2024-03-25 DIAGNOSIS — R79.9 ABNORMAL FINDING OF BLOOD CHEMISTRY, UNSPECIFIED: ICD-10-CM

## 2024-03-25 DIAGNOSIS — D70.9 NEUTROPENIA, UNSPECIFIED TYPE (CMS-HCC): ICD-10-CM

## 2024-03-25 DIAGNOSIS — E21.3 HYPERPARATHYROIDISM (MULTI): ICD-10-CM

## 2024-03-25 DIAGNOSIS — R53.83 OTHER FATIGUE: ICD-10-CM

## 2024-03-25 DIAGNOSIS — Z01.89 ENCOUNTER FOR ROUTINE LABORATORY TESTING: ICD-10-CM

## 2024-03-25 DIAGNOSIS — E55.9 VITAMIN D DEFICIENCY: ICD-10-CM

## 2024-03-25 PROCEDURE — 3074F SYST BP LT 130 MM HG: CPT | Performed by: INTERNAL MEDICINE

## 2024-03-25 PROCEDURE — 99213 OFFICE O/P EST LOW 20 MIN: CPT | Performed by: INTERNAL MEDICINE

## 2024-03-25 PROCEDURE — 3078F DIAST BP <80 MM HG: CPT | Performed by: INTERNAL MEDICINE

## 2024-03-25 PROCEDURE — 1126F AMNT PAIN NOTED NONE PRSNT: CPT | Performed by: INTERNAL MEDICINE

## 2024-03-25 PROCEDURE — 1159F MED LIST DOCD IN RCRD: CPT | Performed by: INTERNAL MEDICINE

## 2024-03-25 PROCEDURE — 1036F TOBACCO NON-USER: CPT | Performed by: INTERNAL MEDICINE

## 2024-03-25 ASSESSMENT — PATIENT HEALTH QUESTIONNAIRE - PHQ9
SUM OF ALL RESPONSES TO PHQ9 QUESTIONS 1 AND 2: 0
1. LITTLE INTEREST OR PLEASURE IN DOING THINGS: NOT AT ALL
2. FEELING DOWN, DEPRESSED OR HOPELESS: NOT AT ALL

## 2024-03-25 ASSESSMENT — PAIN SCALES - GENERAL: PAINLEVEL: 0-NO PAIN

## 2024-03-26 PROBLEM — E66.01 MORBID (SEVERE) OBESITY DUE TO EXCESS CALORIES (MULTI): Status: RESOLVED | Noted: 2023-09-23 | Resolved: 2024-03-26

## 2024-04-05 DIAGNOSIS — F41.8 MIXED ANXIETY DEPRESSIVE DISORDER: Primary | ICD-10-CM

## 2024-04-05 RX ORDER — BUPROPION HYDROCHLORIDE 150 MG/1
TABLET ORAL
Qty: 90 TABLET | Refills: 1 | Status: SHIPPED | OUTPATIENT
Start: 2024-04-05

## 2024-06-12 DIAGNOSIS — K90.9 INTESTINAL MALABSORPTION, UNSPECIFIED TYPE (HHS-HCC): ICD-10-CM

## 2024-06-12 NOTE — PROGRESS NOTES
Subjective   Patient ID: Krystina Ortez is a 69 y.o. female who presents for No chief complaint on file..  HPI  1 YR FUV RYGB   START WT: 276  IDEAL WT: 150  START EXCESS: 126 HT: 65 IN TWL:114 EWL: 90%  VM FULL CAN'T LEAVE MESSAGE RE LABS  Review of Systems  Bariatric Surgery F/U:          Seen at ER after surgery? No.  Hospital admission? No.  Bariatric reoperation? No.  Bariatric intervention? No.  Was anticoagulation initiated for presumed/confirmed Vein Thrombosis/PE? No.  Was an incisional hernia noted on exam? No.  Sleep Apnea? UNKNOWN, WILL ASK PCP TO ORDER RETEST Still using C-PAP? No.  GERD req. meds? No.  Hyperlipidemia? No.  Still taking Cholesterol med? No.  Hypertension? No.  Still taking HTN med? No.  Number of Anti-hypertensive Medications: 0.  Diabetes? No.  Still taking DM med? No.  Current DM medication type: _____.         CONSTITUTIONAL:          Chills No.  Fatigue No.  Fever No.         CARDIOLOGY:          Negative for dizziness, chest pain, palpitations, shortness of breath.         RESPIRATORY:          Negative for chest congestion, cough, wheezing.         GASTROENTEROLOGY:          Food Intolerance No.  Acid reflux No.  Abdominal pain No.  Black stools No.  Constipation No.  Diarrhea No.  Loss of appetite no.  Nausea No.  Vomiting No.         UROLOGY:          Negative for dysuria, urinary urgency, kidney stones.         PSYCHOLOGY:          Negative for depression, anxiety, high stress level.   Objective   Physical Exam    Assessment/Plan            Sommer Glover LPN 06/12/24 12:21 PM

## 2024-06-20 ENCOUNTER — NUTRITION (OUTPATIENT)
Dept: SURGERY | Facility: CLINIC | Age: 69
End: 2024-06-20

## 2024-06-20 ENCOUNTER — LAB (OUTPATIENT)
Dept: LAB | Facility: LAB | Age: 69
End: 2024-06-20
Payer: MEDICARE

## 2024-06-20 ENCOUNTER — APPOINTMENT (OUTPATIENT)
Dept: SURGERY | Facility: CLINIC | Age: 69
End: 2024-06-20
Payer: MEDICARE

## 2024-06-20 VITALS
DIASTOLIC BLOOD PRESSURE: 71 MMHG | WEIGHT: 162 LBS | BODY MASS INDEX: 26.99 KG/M2 | HEIGHT: 65 IN | SYSTOLIC BLOOD PRESSURE: 110 MMHG | HEART RATE: 71 BPM

## 2024-06-20 DIAGNOSIS — K90.9 INTESTINAL MALABSORPTION, UNSPECIFIED TYPE (HHS-HCC): ICD-10-CM

## 2024-06-20 DIAGNOSIS — E55.9 VITAMIN D DEFICIENCY: ICD-10-CM

## 2024-06-20 DIAGNOSIS — K90.9 ABNORMAL INTESTINAL ABSORPTION (HHS-HCC): ICD-10-CM

## 2024-06-20 DIAGNOSIS — E53.8 VITAMIN B12 DEFICIENCY: ICD-10-CM

## 2024-06-20 DIAGNOSIS — K90.9 ABNORMAL INTESTINAL ABSORPTION (HHS-HCC): Primary | ICD-10-CM

## 2024-06-20 DIAGNOSIS — E21.3 HYPERPARATHYROIDISM (MULTI): ICD-10-CM

## 2024-06-20 DIAGNOSIS — Z98.84 H/O BARIATRIC SURGERY: ICD-10-CM

## 2024-06-20 LAB
25(OH)D3 SERPL-MCNC: 27 NG/ML (ref 31–100)
ALBUMIN SERPL-MCNC: 4.1 G/DL (ref 3.5–5)
ALP BLD-CCNC: 91 U/L (ref 35–125)
ALT SERPL-CCNC: 25 U/L (ref 5–40)
ANION GAP SERPL CALC-SCNC: 10 MMOL/L
AST SERPL-CCNC: 21 U/L (ref 5–40)
BASOPHILS # BLD AUTO: 0.03 X10*3/UL (ref 0–0.1)
BASOPHILS NFR BLD AUTO: 0.7 %
BILIRUB SERPL-MCNC: 1 MG/DL (ref 0.1–1.2)
BUN SERPL-MCNC: 13 MG/DL (ref 8–25)
CALCIUM SERPL-MCNC: 9.4 MG/DL (ref 8.5–10.4)
CHLORIDE SERPL-SCNC: 107 MMOL/L (ref 97–107)
CO2 SERPL-SCNC: 26 MMOL/L (ref 24–31)
CREAT SERPL-MCNC: 0.9 MG/DL (ref 0.4–1.6)
EGFRCR SERPLBLD CKD-EPI 2021: 70 ML/MIN/1.73M*2
EOSINOPHIL # BLD AUTO: 0.06 X10*3/UL (ref 0–0.7)
EOSINOPHIL NFR BLD AUTO: 1.5 %
ERYTHROCYTE [DISTWIDTH] IN BLOOD BY AUTOMATED COUNT: 13.3 % (ref 11.5–14.5)
FERRITIN SERPL-MCNC: 126 NG/ML (ref 13–150)
FOLATE SERPL-MCNC: 17.7 NG/ML (ref 4.2–19.9)
GLUCOSE SERPL-MCNC: 77 MG/DL (ref 65–99)
HCT VFR BLD AUTO: 36.5 % (ref 36–46)
HGB BLD-MCNC: 12.9 G/DL (ref 12–16)
IMM GRANULOCYTES # BLD AUTO: 0 X10*3/UL (ref 0–0.7)
IMM GRANULOCYTES NFR BLD AUTO: 0 % (ref 0–0.9)
IRON SATN MFR SERPL: 33 % (ref 12–50)
IRON SERPL-MCNC: 91 UG/DL (ref 30–160)
LYMPHOCYTES # BLD AUTO: 2.29 X10*3/UL (ref 1.2–4.8)
LYMPHOCYTES NFR BLD AUTO: 56.1 %
MCH RBC QN AUTO: 29.5 PG (ref 26–34)
MCHC RBC AUTO-ENTMCNC: 35.3 G/DL (ref 32–36)
MCV RBC AUTO: 83 FL (ref 80–100)
MONOCYTES # BLD AUTO: 0.34 X10*3/UL (ref 0.1–1)
MONOCYTES NFR BLD AUTO: 8.3 %
NEUTROPHILS # BLD AUTO: 1.36 X10*3/UL (ref 1.2–7.7)
NEUTROPHILS NFR BLD AUTO: 33.4 %
NRBC BLD-RTO: 0 /100 WBCS (ref 0–0)
PLATELET # BLD AUTO: 172 X10*3/UL (ref 150–450)
POTASSIUM SERPL-SCNC: 4.5 MMOL/L (ref 3.4–5.1)
PROT SERPL-MCNC: 6.6 G/DL (ref 5.9–7.9)
RBC # BLD AUTO: 4.38 X10*6/UL (ref 4–5.2)
SODIUM SERPL-SCNC: 143 MMOL/L (ref 133–145)
TIBC SERPL-MCNC: 274 UG/DL (ref 228–428)
UIBC SERPL-MCNC: 183 UG/DL (ref 110–370)
VIT B12 SERPL-MCNC: 388 PG/ML (ref 211–946)
WBC # BLD AUTO: 4.1 X10*3/UL (ref 4.4–11.3)

## 2024-06-20 PROCEDURE — 82306 VITAMIN D 25 HYDROXY: CPT

## 2024-06-20 PROCEDURE — 36415 COLL VENOUS BLD VENIPUNCTURE: CPT

## 2024-06-20 PROCEDURE — 83540 ASSAY OF IRON: CPT

## 2024-06-20 PROCEDURE — 84630 ASSAY OF ZINC: CPT

## 2024-06-20 PROCEDURE — 82746 ASSAY OF FOLIC ACID SERUM: CPT

## 2024-06-20 PROCEDURE — 3078F DIAST BP <80 MM HG: CPT

## 2024-06-20 PROCEDURE — 3074F SYST BP LT 130 MM HG: CPT

## 2024-06-20 PROCEDURE — 1159F MED LIST DOCD IN RCRD: CPT

## 2024-06-20 PROCEDURE — 85025 COMPLETE CBC W/AUTO DIFF WBC: CPT

## 2024-06-20 PROCEDURE — 1126F AMNT PAIN NOTED NONE PRSNT: CPT

## 2024-06-20 PROCEDURE — 99214 OFFICE O/P EST MOD 30 MIN: CPT

## 2024-06-20 PROCEDURE — 82728 ASSAY OF FERRITIN: CPT

## 2024-06-20 PROCEDURE — 83550 IRON BINDING TEST: CPT

## 2024-06-20 PROCEDURE — 84425 ASSAY OF VITAMIN B-1: CPT

## 2024-06-20 PROCEDURE — 82607 VITAMIN B-12: CPT

## 2024-06-20 PROCEDURE — 82525 ASSAY OF COPPER: CPT

## 2024-06-20 PROCEDURE — 80053 COMPREHEN METABOLIC PANEL: CPT

## 2024-06-20 PROCEDURE — 83970 ASSAY OF PARATHORMONE: CPT

## 2024-06-20 RX ORDER — NAPROXEN 500 MG/1
500 TABLET ORAL
COMMUNITY

## 2024-06-20 ASSESSMENT — PAIN SCALES - GENERAL: PAINLEVEL: 0-NO PAIN

## 2024-06-20 NOTE — PROGRESS NOTES
Subjective   Patient ID: Krystina Ortez is a 69 y.o. female who presents for Follow-up (1 YR FUV RYGB).  ELISE Flaherty is here for her 1 year follow up from RYGB. She feels great. She has lost 90% of her excess weight. She has no reflux. She tells me that she eats 4-5 small meals throughout the day. She is not really doing anything for exercise. She does yard work in the summer. She takes women's MVI from [employer] and calcium citrate two tablets once per day. She has recently started a liquid collagen supplement. She will get her labwork after her appointment.       Objective   Physical Exam  Constitutional:       Appearance: Normal appearance.   Eyes:      Pupils: Pupils are equal, round, and reactive to light.   Cardiovascular:      Rate and Rhythm: Normal rate.   Pulmonary:      Effort: Pulmonary effort is normal.   Abdominal:      Palpations: Abdomen is soft.   Musculoskeletal:         General: Normal range of motion.   Skin:     General: Skin is warm and dry.   Neurological:      General: No focal deficit present.      Mental Status: She is alert and oriented to person, place, and time.   Psychiatric:         Mood and Affect: Mood normal.        Latest Reference Range & Units 06/20/24 10:45   GLUCOSE 65 - 99 mg/dL 77   SODIUM 133 - 145 mmol/L 143   POTASSIUM 3.4 - 5.1 mmol/L 4.5   CHLORIDE 97 - 107 mmol/L 107   Bicarbonate 24 - 31 mmol/L 26   Anion Gap <=19 mmol/L 10   Blood Urea Nitrogen 8 - 25 mg/dL 13   Creatinine 0.40 - 1.60 mg/dL 0.90   EGFR >60 mL/min/1.73m*2 70   Calcium 8.5 - 10.4 mg/dL 9.4   Albumin 3.5 - 5.0 g/dL 4.1   Alkaline Phosphatase 35 - 125 U/L 91   ALT 5 - 40 U/L 25   AST 5 - 40 U/L 21   Bilirubin Total 0.1 - 1.2 mg/dL 1.0   FERRITIN 13 - 150 ng/mL 126   FOLATE 4.2 - 19.9 ng/mL 17.7   Total Protein 5.9 - 7.9 g/dL 6.6   IRON 30 - 160 ug/dL 91   TIBC 228 - 428 ug/dL 274   UIBC 110 - 370 ug/dL 183   % Saturation 12 - 50 % 33   Vitamin B12 211 - 946 pg/mL 388   Vitamin D, 25-Hydroxy, Total 31 - 100  ng/mL 27 (L)   LEUKOCYTES (10*3/UL) IN BLOOD BY AUTOMATED COUNT, Taiwanese 4.4 - 11.3 x10*3/uL 4.1 (L)   nRBC 0.0 - 0.0 /100 WBCs 0.0   ERYTHROCYTES (10*6/UL) IN BLOOD BY AUTOMATED COUNT, [place]ese 4.00 - 5.20 x10*6/uL 4.38   HEMOGLOBIN 12.0 - 16.0 g/dL 12.9   HEMATOCRIT 36.0 - 46.0 % 36.5   MCV 80 - 100 fL 83   MCH 26.0 - 34.0 pg 29.5   MCHC 32.0 - 36.0 g/dL 35.3   RED CELL DISTRIBUTION WIDTH 11.5 - 14.5 % 13.3   PLATELETS (10*3/UL) IN BLOOD AUTOMATED COUNT, Taiwanese 150 - 450 x10*3/uL 172   NEUTROPHILS/100 LEUKOCYTES IN BLOOD BY AUTOMATED COUNT, Taiwanese 40.0 - 80.0 % 33.4   Immature Granulocytes %, Automated 0.0 - 0.9 % 0.0   Lymphocytes % 13.0 - 44.0 % 56.1   Monocytes % 2.0 - 10.0 % 8.3   Eosinophils % 0.0 - 6.0 % 1.5   Basophils % 0.0 - 2.0 % 0.7   NEUTROPHILS (10*3/UL) IN BLOOD BY AUTOMATED COUNT, Taiwanese 1.20 - 7.70 x10*3/uL 1.36   Immature Granulocytes Absolute, Automated 0.00 - 0.70 x10*3/uL 0.00   Lymphocytes Absolute 1.20 - 4.80 x10*3/uL 2.29   Monocytes Absolute 0.10 - 1.00 x10*3/uL 0.34   Eosinophils Absolute 0.00 - 0.70 x10*3/uL 0.06   Basophils Absolute 0.00 - 0.10 x10*3/uL 0.03     Assessment/Plan   Problem List Items Addressed This Visit             ICD-10-CM    Abnormal intestinal absorption (HHS-HCC) - Primary K90.9    Relevant Orders    CBC and Auto Differential    Comprehensive Metabolic Panel    Copper, Blood    Ferritin    Folate    Iron and TIBC    Parathyroid Hormone, Intact    Vitamin B1, Whole Blood    Vitamin B12    Vitamin D 25-Hydroxy,Total (for eval of Vitamin D levels)    Zinc, Serum or Plasma    Hyperparathyroidism (Multi) E21.3    Relevant Orders    Parathyroid Hormone, Intact    Vitamin D deficiency E55.9    Relevant Orders    Vitamin D 25-Hydroxy,Total (for eval of Vitamin D levels)    Vitamin B12 deficiency E53.8    Relevant Orders    Vitamin B12    H/O bariatric surgery Z98.84     Krystina is doing well with her weigh loss. I reminded her that a RYGB is not compatible with ASA or  NSAID use. She will continue her current supplementation. I will call her with any changes in her supplementation when her lab results come back. We will recheck her labwork at her next follow up in 6 months.        SHIVA Rider-CNP 06/20/24 9:13 AM

## 2024-06-20 NOTE — PROGRESS NOTES
Bariatric Post-Op Follow Up Appointment:    Starting Weight: 276 lbs  Current Weight: 162 lbs   Current BMI: 26.96   Percentage of weight loss achieved: 90% EWL at 1 year post op     Dietary Intake:  Breakfast- protein powder in coffee (22g protein)   Lunch- Varies. Spinach salad with tomatoes, cucumbers and dressing.   Dinner- Having chicken breast tonight. Sunday roast beef with mashed potatoes and corn. Baked potato with hernandez bits.   Protein shake every other night (30g protein)   Volume of food at a time: 1/2 cup   Snacks: small portions throughout the day   Beverages: green tea, coffee in the morning    Alcohol Intake: 1-2 glasses of wine per month   Do you drink with your meals?  Current Exercise: Was doing water aerobics at the [facility]. Goes to [employer] but it is not consistent - once a wk or less. This past Saturday did a [employer] classes. Would like to get in a planned routine.   Vitamins/minerals: Womens one a day MVI with iron, Citracal two tablets - not always consistent about taking, liquid collagen supplement. Is not taking B12 or vitamin D. Did not get blood work done yet.   Food intolerances? None reported   Any decrease in energy levels? No , energy levels are great   Any questions or concerns? No     Jenifer Meredith, MS, RD, LD

## 2024-06-21 LAB — PTH-INTACT SERPL-MCNC: 50.8 PG/ML (ref 18.5–88)

## 2024-06-23 LAB
COPPER SERPL-MCNC: 101.9 UG/DL (ref 80–155)
ZINC SERPL-MCNC: 68.7 UG/DL (ref 60–120)

## 2024-06-25 ENCOUNTER — TELEPHONE (OUTPATIENT)
Dept: PRIMARY CARE | Facility: CLINIC | Age: 69
End: 2024-06-25
Payer: MEDICARE

## 2024-06-25 LAB — VIT B1 PYROPHOSHATE BLD-SCNC: 85 NMOL/L (ref 70–180)

## 2024-06-25 NOTE — TELEPHONE ENCOUNTER
Pt states she gets a bill every month for her CPAP machine.  States she is not even using her CPAP anymore because she lost weight and no longer believes she needs it.      Pt asking if you could order a sleep study to prove she does not need CPAP machine anymore.  Please advise.  Ph: [phone]

## 2024-06-25 NOTE — TELEPHONE ENCOUNTER
Looks like the CPAP was coordinated through Dr. Saleh/Dr. Jacobs's office.  She has had the machine since February 2023.

## 2024-06-26 ENCOUNTER — DOCUMENTATION (OUTPATIENT)
Dept: SURGERY | Facility: CLINIC | Age: 69
End: 2024-06-26
Payer: MEDICARE

## 2024-06-26 DIAGNOSIS — G47.33 OSA ON CPAP: Primary | ICD-10-CM

## 2024-06-26 NOTE — PROGRESS NOTES
Krystina will add and over the counter B12 and D3 supplement to her MVI. We will recheck her labwork at her next follow up.

## 2024-06-26 NOTE — TELEPHONE ENCOUNTER
Dr Jordy Thompson's office states after surgical procedure, pt is required to follow up with PCP.  In order to return CPAP machine, pt just needs a new sleep study and asking if you can order this.  Please advise.  Ph: [phone] option 3, Sommer

## 2024-07-30 ENCOUNTER — OFFICE VISIT (OUTPATIENT)
Dept: SLEEP MEDICINE | Facility: CLINIC | Age: 69
End: 2024-07-30
Payer: MEDICARE

## 2024-07-30 ENCOUNTER — APPOINTMENT (OUTPATIENT)
Dept: SLEEP MEDICINE | Facility: CLINIC | Age: 69
End: 2024-07-30
Payer: MEDICARE

## 2024-07-30 VITALS
OXYGEN SATURATION: 99 % | SYSTOLIC BLOOD PRESSURE: 86 MMHG | HEART RATE: 79 BPM | HEIGHT: 67 IN | WEIGHT: 165 LBS | BODY MASS INDEX: 25.9 KG/M2 | DIASTOLIC BLOOD PRESSURE: 62 MMHG

## 2024-07-30 DIAGNOSIS — Z98.84 H/O BARIATRIC SURGERY: ICD-10-CM

## 2024-07-30 DIAGNOSIS — G47.33 OBSTRUCTIVE SLEEP APNEA SYNDROME: Primary | ICD-10-CM

## 2024-07-30 PROBLEM — R40.0 DAYTIME SOMNOLENCE: Status: RESOLVED | Noted: 2023-09-23 | Resolved: 2024-07-30

## 2024-07-30 PROBLEM — I10 BENIGN ESSENTIAL HTN: Status: RESOLVED | Noted: 2023-09-23 | Resolved: 2024-07-30

## 2024-07-30 PROCEDURE — 3008F BODY MASS INDEX DOCD: CPT | Performed by: PHYSICIAN ASSISTANT

## 2024-07-30 PROCEDURE — 1160F RVW MEDS BY RX/DR IN RCRD: CPT | Performed by: PHYSICIAN ASSISTANT

## 2024-07-30 PROCEDURE — 99203 OFFICE O/P NEW LOW 30 MIN: CPT | Performed by: PHYSICIAN ASSISTANT

## 2024-07-30 PROCEDURE — 1126F AMNT PAIN NOTED NONE PRSNT: CPT | Performed by: PHYSICIAN ASSISTANT

## 2024-07-30 PROCEDURE — 1036F TOBACCO NON-USER: CPT | Performed by: PHYSICIAN ASSISTANT

## 2024-07-30 PROCEDURE — 1159F MED LIST DOCD IN RCRD: CPT | Performed by: PHYSICIAN ASSISTANT

## 2024-07-30 ASSESSMENT — PAIN SCALES - GENERAL: PAINLEVEL: 0-NO PAIN

## 2024-07-30 NOTE — PATIENT INSTRUCTIONS
Thank you for coming to the Sleep Medicine Clinic today! Your sleep medicine provider today was: Nilesh Beck PA-C Below is a summary of your treatment plan, other important information, and our contact numbers:      TREATMENT PLAN     Call [phone]), option 3 to schedule your sleep study. When you have an appointment please call us back at [phone] to schedule a followup appointment 3-4 weeks after to review results.    Obstructive Sleep Apnea (ELÍAS) is a sleep disorder where your upper airway muscles relax during sleep and the airway intermittently and repetitively narrows and collapses leading to partially blocked airway (hypopnea) or completely blocked airway (apnea) which, in turn, can disrupt breathing in sleep, lower oxygen levels while you sleep and cause night time wakings. Because both apnea and hypopnea may cause higher carbon dioxide or low oxygen levels, untreated ELÍAS can lead to heart arrhythmia, elevation of blood pressure, and make it harder for the body to consolidate memory and facilitate metabolism (leading to higher blood sugars at night). Frequent partial arousals occur during sleep resulting in sleep deprivation and daytime sleepiness. ELÍAS is associated with an increased risk of cardiovascular disease, stroke, hypertension, and insulin resistance. Moreover, untreated ELÍAS with excessive daytime sleepiness can increase the risk of motor vehicular accidents.    Some conservative strategies for ELÍAS regardless of ELÍAS severity are:   Positional therapy - Avoid sleeping on your back.   Healthy diet and regular exercise to optimize weight is highly encouraged.   Avoid alcohol late in the evening and sedative-hypnotics as these substances can make sleep apnea worse.   Improve breathing through the nose with intranasal steroid spray, saline rinse, or antihistamines    Safety: Avoid driving vehicle and operating heavy equipment while sleepy. Drowsy driving may lead to life-threatening  motor vehicle accidents. A person driving while sleepy is 5 times more likely to have an accident. If you feel sleepy, pull over and take a short power nap (sleep for less than 30 minutes). Otherwise, ask somebody to drive you.    Treatment options for sleep apnea include weight management, positional therapy, Positive Airway Therapy (PAP) therapy, oral appliance therapy, hypoglossal nerve stimulator (Inspire) and select airway surgeries.      OUR SLEEP TESTING LOCATIONS     Our team will contact you to schedule your sleep study, however, you can contact us as follow:  Main Phone Line (scheduling only): [phone]), option 3  Adult and Pediatric Locations  [facility] (6 years and older): [facility] - 4th floor ([address]) After hours line: [phone]  [facility] (Main campus: All ages): [facility], 6th floor. After hours line: [phone]   [place] (18 years and older): [address] [facility], 2nd floor   [place] (18 years and older): [address] [facility]; 4th floor  After hours line: [phone]  [facility] (18 years and older) at [place]: [address] [facility]  After hours line: [phone]    [place] (5 years and older; younger considered on case-by-case basis): [address] [facility]; [address], [address]. Scheduling  After hours line: [phone]   [place] (6 years and older): [address]; [address]   [place] (6 years and older): [address] [facility], [address]  After hours line: [phone]   [place] (13 years and older) in [place]: [address], 2nd floor  After hours line: [phone]   [place] (13 year and older): [address]  After hours line: [phone]      IMPORTANT PHONE NUMBERS     Sleep Medicine Clinic Fax: [phone]  Appointments (for Adult Sleep Clinic): [phone] - option 2  Appointments (For Sleep Studies):  [phone] - option 3  Behavioral Sleep Medicine: [phone]    [facility] ([facility]): [phone]  For clinical questions and refilling prescriptions: [phone]  Anastasiia Benjamin (For Carrie/Ebony): P: [phone]  F: [phone]       CONTACTING YOUR SLEEP MEDICINE PROVIDER     Send a message directly to your provider through \"My Chart\", which is the email service through your  Records Account: [email or web address]   Call [phone] and leave a message. One of the administrative assistants will forward the message to your sleep medicine provider through \"My Chart\" and/or email.     Your sleep medicine provider for this visit was: Nilesh Beck PA-C  "

## 2024-07-30 NOTE — PROGRESS NOTES
"[facility]  New Visit Note        HISTORY OF PRESENT ILLNESS     The patient's referring provider is: No ref. provider found    HISTORY OF PRESENT ILLNESS   Krystina Ortez is a 69 y.o. female who presents to a [facility] for a sleep medicine evaluation with concerns of Sleep Apnea, Pap Adherence Followup, Pap Intolerance, and Sleep Study.     PAST SLEEP HISTORY    Patient has the following sleep-related diagnoses and sleep study results: She had a home sleep study in 2023 that was positive for sleep apnea.  She is not sure of severity and results not available today.    CURRENT HISTORY    On today's visit, the patient reports she underwent bariatric surgery in summer 2023 and her weight is down about 100 pounds from her peak weight.  She is wondering if she still needs CPAP therapy after her weight loss.    She is currently sleeping well without CPAP.  She denies any daytime sleepiness, denies poor sleep quality, not sure if she snores anymore.  She wakes up about 1-2 times per night.  This is minimally bothersome.    She does not have hypertension.    STOP  0  BANG 1 A    Sleep schedule  on weekdays / work days:  Usual Bedtime  12 a  Falls asleep around  1215 am  Wake time  6 a    Sleep schedule  on weekends/non work days :  Usual Bedtime  12 a  Falls asleep around 1230 a  Wake time  7 a    Naps:   no    Average sleep duration 6 hours/day    Preferred sleeping position: side    Sleep-related ROS:    Sleep Initiation: no problems going to sleep    Sleep Maintenance: wakes 1-2 times per night    Recreational drug use  Smoking: never  Alcohol consumption: 1-2/mo  Caffeine consumption:  1/day  Marijuana: no    ESS: 5      PHYSICAL EXAM     VITAL SIGNS: BP 86/62   Pulse 79   Ht 1.702 m (5' 7\")   Wt 74.8 kg (165 lb)   SpO2 99%   BMI 25.84 kg/m²      PREVIOUS WEIGHTS:  Wt Readings from Last 3 Encounters:   07/30/24 74.8 kg (165 lb)   06/20/24 73.5 kg " (162 lb)   03/25/24 78.5 kg (173 lb)       PHYSICAL EXAM: GENERAL: alert oriented x 3 pleasant and cooperative no acute distress  MODIFIED MALLAMPATI SCORE: 2+  LATERAL PHARYNGEAL WALL: 2+  NECK EXAM: normal supple no adenopathy    RESULTS/DATA     Iron   Date Value   06/20/2024 91 ug/dL   12/27/2023 93 ug/dL   09/25/2023 90 UG/DL   01/04/2023 69 UG/DL     % Saturation (%)   Date Value   06/20/2024 33   12/27/2023 34     Iron Saturation (%)   Date Value   09/25/2023 34.5   01/04/2023 22.7     TIBC   Date Value   06/20/2024 274 ug/dL   12/27/2023 275 ug/dL   09/25/2023 261 UG/DL   01/04/2023 304 UG/DL     Ferritin   Date Value   06/20/2024 126 ng/mL   12/27/2023 230 ng/mL (H)   09/25/2023 216 NG/ML (H)   01/04/2023 104 NG/ML       ASSESSMENT/PLAN     Ms. Ortez is a 69 y.o. female and was referred to the [facility] for the following issues:    OBSTRUCTIVE SLEEP APNEA / BARIATRIC SURGERY  -Ordering sleep study to evaluate  -Strongly suspect she no longer has ELÍAS and needs treatment - HSAT to verify  -Weight down ~100 lbs since last sleep study which was positive for ELÍAS    Followup 3 weeks after sleep study to review results - may call if negative

## 2024-08-02 ENCOUNTER — PROCEDURE VISIT (OUTPATIENT)
Dept: SLEEP MEDICINE | Facility: HOSPITAL | Age: 69
End: 2024-08-02
Payer: MEDICARE

## 2024-08-02 DIAGNOSIS — G47.33 OBSTRUCTIVE SLEEP APNEA SYNDROME: ICD-10-CM

## 2024-08-02 PROCEDURE — 95806 SLEEP STUDY UNATT&RESP EFFT: CPT | Performed by: INTERNAL MEDICINE

## 2024-08-02 NOTE — LETTER
August 22, 2024    Krystina Ortez  [address] [address]      Dear Ms. Ortez:      We have been unable to contact you by phone regarding a follow up appointment.  Please call the office at [phone] so we can assist you in getting scheduled.    If you have any questions or concerns, please don't hesitate to call.    Sincerely,          Felicita Arredondo        CC: No Recipients

## 2024-08-29 NOTE — PROGRESS NOTES
[facility]  Followup Visit Note    HISTORY OF PRESENT ILLNESS   Krystina Ortez is a 69 y.o. female who presents to a [facility] for followup.       Current History    Her home sleep study on 8/2/2024 was consistent with moderate severity sleep apnea with AHI of 18.5 and spo2 <88% for 2.6 minutes.    She feels she is sleeping better since her weight loss. Doesn't think she needs CPAP anymore.    She is using a patch called X39 and thinks she is  sleeping very well now.    Previous visit 07/30/2024  OBSTRUCTIVE SLEEP APNEA / BARIATRIC SURGERY  -Ordering sleep study to evaluate  -Strongly suspect she no longer has ELÍAS and needs treatment - HSAT to verify  -Weight down ~100 lbs since last sleep study which was positive for ELÍAS    Followup 3 weeks after sleep study to review results - may call if negative  Home sleep apnea test (HSAT) [[id number]]   Procedure Visit on 8/2/24 with Ann Marie Edouard       PAP Adherence:      Sleep Scales:  ESS: No data recorded     REVIEW OF SYSTEMS    All other systems negative      PHYSICAL EXAM     VITAL SIGNS: There were no vitals taken for this visit.     PREVIOUS WEIGHTS:  Wt Readings from Last 3 Encounters:   07/30/24 74.8 kg (165 lb)   06/20/24 73.5 kg (162 lb)   03/25/24 78.5 kg (173 lb)       Constitutional: Alert and oriented, cooperative, no obvious distress   HEENT: Non icteric or anemic, EOM WNL bilaterally   Neck: Supple, no JVD, no goiter, no adenopathy, no rigidity  Extremities: No clubbing, no LL edema   Neuromuscular: Cranial nerves grossly intact, no focal deficits     RESULTS/DATA     Iron   Date Value   06/20/2024 91 ug/dL   12/27/2023 93 ug/dL   09/25/2023 90 UG/DL   01/04/2023 69 UG/DL     % Saturation (%)   Date Value   06/20/2024 33   12/27/2023 34     Iron Saturation (%)   Date Value   09/25/2023 34.5   01/04/2023 22.7     TIBC   Date Value   06/20/2024 274 ug/dL   12/27/2023 275 ug/dL   09/25/2023 261  UG/DL   01/04/2023 304 UG/DL     Ferritin   Date Value   06/20/2024 126 ng/mL   12/27/2023 230 ng/mL (H)   09/25/2023 216 NG/ML (H)   01/04/2023 104 NG/ML         ASSESSMENT/PLAN     Ms. Ortez is a 69 y.o. female and returns in followup for the following issues:    OBSTRUCTIVE SLEEP APNEA  -try CPAP for 1 week  -if no benefits in terms of sleepiness and sleep quality she will stop  -consider re-test in 1 year for ELÍAS    Follow up as needed

## 2024-08-30 ENCOUNTER — APPOINTMENT (OUTPATIENT)
Dept: SLEEP MEDICINE | Facility: CLINIC | Age: 69
End: 2024-08-30
Payer: MEDICARE

## 2024-08-30 VITALS
DIASTOLIC BLOOD PRESSURE: 65 MMHG | HEIGHT: 67 IN | OXYGEN SATURATION: 100 % | WEIGHT: 175 LBS | HEART RATE: 74 BPM | SYSTOLIC BLOOD PRESSURE: 97 MMHG | BODY MASS INDEX: 27.47 KG/M2

## 2024-08-30 DIAGNOSIS — Z98.84 H/O BARIATRIC SURGERY: ICD-10-CM

## 2024-08-30 DIAGNOSIS — G47.33 OBSTRUCTIVE SLEEP APNEA SYNDROME: Primary | ICD-10-CM

## 2024-08-30 PROCEDURE — 3008F BODY MASS INDEX DOCD: CPT | Performed by: PHYSICIAN ASSISTANT

## 2024-08-30 PROCEDURE — 1160F RVW MEDS BY RX/DR IN RCRD: CPT | Performed by: PHYSICIAN ASSISTANT

## 2024-08-30 PROCEDURE — 99213 OFFICE O/P EST LOW 20 MIN: CPT | Performed by: PHYSICIAN ASSISTANT

## 2024-08-30 PROCEDURE — 1036F TOBACCO NON-USER: CPT | Performed by: PHYSICIAN ASSISTANT

## 2024-08-30 PROCEDURE — 1126F AMNT PAIN NOTED NONE PRSNT: CPT | Performed by: PHYSICIAN ASSISTANT

## 2024-08-30 PROCEDURE — 1159F MED LIST DOCD IN RCRD: CPT | Performed by: PHYSICIAN ASSISTANT

## 2024-08-30 ASSESSMENT — LIFESTYLE VARIABLES
SKIP TO QUESTIONS 9-10: 1
AUDIT-C TOTAL SCORE: 2
HOW OFTEN DO YOU HAVE SIX OR MORE DRINKS ON ONE OCCASION: NEVER
HOW OFTEN DO YOU HAVE A DRINK CONTAINING ALCOHOL: 2-4 TIMES A MONTH
HOW MANY STANDARD DRINKS CONTAINING ALCOHOL DO YOU HAVE ON A TYPICAL DAY: 1 OR 2

## 2024-08-30 ASSESSMENT — SLEEP AND FATIGUE QUESTIONNAIRES
ESS-CHAD TOTAL SCORE: 6
HOW LIKELY ARE YOU TO NOD OFF OR FALL ASLEEP WHILE LYING DOWN TO REST IN THE AFTERNOON WHEN CIRCUMSTANCES PERMIT: SLIGHT CHANCE OF DOZING
HOW LIKELY ARE YOU TO NOD OFF OR FALL ASLEEP WHEN YOU ARE A PASSENGER IN A CAR FOR AN HOUR WITHOUT A BREAK: SLIGHT CHANCE OF DOZING
HOW LIKELY ARE YOU TO NOD OFF OR FALL ASLEEP WHILE SITTING AND READING: MODERATE CHANCE OF DOZING
HOW LIKELY ARE YOU TO NOD OFF OR FALL ASLEEP WHILE SITTING QUIETLY AFTER LUNCH WITHOUT ALCOHOL: WOULD NEVER DOZE
HOW LIKELY ARE YOU TO NOD OFF OR FALL ASLEEP IN A CAR, WHILE STOPPED FOR A FEW MINUTES IN TRAFFIC: WOULD NEVER DOZE
HOW LIKELY ARE YOU TO NOD OFF OR FALL ASLEEP WHILE SITTING AND TALKING TO SOMEONE: WOULD NEVER DOZE
HOW LIKELY ARE YOU TO NOD OFF OR FALL ASLEEP WHILE WATCHING TV: MODERATE CHANCE OF DOZING
SITING INACTIVE IN A PUBLIC PLACE LIKE A CLASS ROOM OR A MOVIE THEATER: WOULD NEVER DOZE

## 2024-08-30 ASSESSMENT — PAIN SCALES - GENERAL: PAINLEVEL: 0-NO PAIN

## 2024-08-30 NOTE — PATIENT INSTRUCTIONS
Good seeing you today,    I do recommend you try CPAP for 1 week and see if he notes any benefits.    If you do not it would be reasonable to return the machine.     Let me know how you feel back on CPAP    Nilesh Beck PA-C    IMPORTANT PHONE NUMBERS     [phone]  [employer]): [phone]  For clinical questions and refilling prescriptions: [phone]  Anastasiia Benjamin (For Carrie/Ebony): P: [phone]

## 2024-09-06 ENCOUNTER — LAB (OUTPATIENT)
Dept: LAB | Facility: LAB | Age: 69
End: 2024-09-06
Payer: MEDICARE

## 2024-09-06 DIAGNOSIS — E55.9 VITAMIN D DEFICIENCY: ICD-10-CM

## 2024-09-06 DIAGNOSIS — R79.9 ABNORMAL FINDING OF BLOOD CHEMISTRY, UNSPECIFIED: ICD-10-CM

## 2024-09-06 DIAGNOSIS — Z01.89 ENCOUNTER FOR ROUTINE LABORATORY TESTING: ICD-10-CM

## 2024-09-06 DIAGNOSIS — R53.83 OTHER FATIGUE: ICD-10-CM

## 2024-09-06 LAB
25(OH)D3 SERPL-MCNC: 36 NG/ML (ref 31–100)
ALBUMIN SERPL-MCNC: 4.1 G/DL (ref 3.5–5)
ALP BLD-CCNC: 94 U/L (ref 35–125)
ALT SERPL-CCNC: 29 U/L (ref 5–40)
ANION GAP SERPL CALC-SCNC: 9 MMOL/L
AST SERPL-CCNC: 19 U/L (ref 5–40)
BASOPHILS # BLD AUTO: 0.02 X10*3/UL (ref 0–0.1)
BASOPHILS NFR BLD AUTO: 0.5 %
BILIRUB SERPL-MCNC: 1.2 MG/DL (ref 0.1–1.2)
BUN SERPL-MCNC: 11 MG/DL (ref 8–25)
CALCIUM SERPL-MCNC: 9.3 MG/DL (ref 8.5–10.4)
CHLORIDE SERPL-SCNC: 109 MMOL/L (ref 97–107)
CHOLEST SERPL-MCNC: 154 MG/DL (ref 133–200)
CHOLEST/HDLC SERPL: 1.8 {RATIO}
CO2 SERPL-SCNC: 26 MMOL/L (ref 24–31)
CREAT SERPL-MCNC: 1 MG/DL (ref 0.4–1.6)
EGFRCR SERPLBLD CKD-EPI 2021: 62 ML/MIN/1.73M*2
EOSINOPHIL # BLD AUTO: 0.04 X10*3/UL (ref 0–0.7)
EOSINOPHIL NFR BLD AUTO: 1.1 %
ERYTHROCYTE [DISTWIDTH] IN BLOOD BY AUTOMATED COUNT: 13.3 % (ref 11.5–14.5)
EST. AVERAGE GLUCOSE BLD GHB EST-MCNC: 80 MG/DL
GLUCOSE SERPL-MCNC: 91 MG/DL (ref 65–99)
HBA1C MFR BLD: 4.4 %
HCT VFR BLD AUTO: 35.3 % (ref 36–46)
HDLC SERPL-MCNC: 87 MG/DL
HGB BLD-MCNC: 12.5 G/DL (ref 12–16)
IMM GRANULOCYTES # BLD AUTO: 0 X10*3/UL (ref 0–0.7)
IMM GRANULOCYTES NFR BLD AUTO: 0 % (ref 0–0.9)
LDLC SERPL CALC-MCNC: 50 MG/DL (ref 65–130)
LYMPHOCYTES # BLD AUTO: 1.91 X10*3/UL (ref 1.2–4.8)
LYMPHOCYTES NFR BLD AUTO: 51.5 %
MCH RBC QN AUTO: 29.6 PG (ref 26–34)
MCHC RBC AUTO-ENTMCNC: 35.4 G/DL (ref 32–36)
MCV RBC AUTO: 84 FL (ref 80–100)
MONOCYTES # BLD AUTO: 0.35 X10*3/UL (ref 0.1–1)
MONOCYTES NFR BLD AUTO: 9.4 %
NEUTROPHILS # BLD AUTO: 1.39 X10*3/UL (ref 1.2–7.7)
NEUTROPHILS NFR BLD AUTO: 37.5 %
NRBC BLD-RTO: 0 /100 WBCS (ref 0–0)
PLATELET # BLD AUTO: 217 X10*3/UL (ref 150–450)
POTASSIUM SERPL-SCNC: 4.4 MMOL/L (ref 3.4–5.1)
PROT SERPL-MCNC: 6.6 G/DL (ref 5.9–7.9)
RBC # BLD AUTO: 4.22 X10*6/UL (ref 4–5.2)
SODIUM SERPL-SCNC: 144 MMOL/L (ref 133–145)
TRIGL SERPL-MCNC: 86 MG/DL (ref 40–150)
TSH SERPL DL<=0.05 MIU/L-ACNC: 1.9 MIU/L (ref 0.27–4.2)
WBC # BLD AUTO: 3.7 X10*3/UL (ref 4.4–11.3)

## 2024-09-06 PROCEDURE — 85025 COMPLETE CBC W/AUTO DIFF WBC: CPT

## 2024-09-06 PROCEDURE — 80061 LIPID PANEL: CPT

## 2024-09-06 PROCEDURE — 84443 ASSAY THYROID STIM HORMONE: CPT

## 2024-09-06 PROCEDURE — 82306 VITAMIN D 25 HYDROXY: CPT

## 2024-09-06 PROCEDURE — 83036 HEMOGLOBIN GLYCOSYLATED A1C: CPT

## 2024-09-06 PROCEDURE — 80053 COMPREHEN METABOLIC PANEL: CPT

## 2024-09-06 PROCEDURE — 36415 COLL VENOUS BLD VENIPUNCTURE: CPT

## 2024-09-10 ENCOUNTER — HOSPITAL ENCOUNTER (OUTPATIENT)
Dept: RADIOLOGY | Facility: CLINIC | Age: 69
Discharge: HOME | End: 2024-09-10
Payer: MEDICARE

## 2024-09-10 VITALS — BODY MASS INDEX: 27.03 KG/M2 | WEIGHT: 170 LBS

## 2024-09-10 DIAGNOSIS — Z12.31 ENCOUNTER FOR SCREENING MAMMOGRAM FOR BREAST CANCER: ICD-10-CM

## 2024-09-10 PROCEDURE — 77063 BREAST TOMOSYNTHESIS BI: CPT | Performed by: RADIOLOGY

## 2024-09-10 PROCEDURE — 77067 SCR MAMMO BI INCL CAD: CPT | Performed by: RADIOLOGY

## 2024-09-10 PROCEDURE — 77067 SCR MAMMO BI INCL CAD: CPT

## 2024-09-17 PROBLEM — E80.6 HYPERBILIRUBINEMIA: Status: ACTIVE | Noted: 2024-09-17

## 2024-09-17 PROBLEM — G47.33 OBSTRUCTIVE SLEEP APNEA SYNDROME: Status: ACTIVE | Noted: 2024-09-17

## 2024-09-17 PROBLEM — K22.2 STRICTURE OF ESOPHAGUS: Status: ACTIVE | Noted: 2023-06-21

## 2024-09-17 PROBLEM — K22.89 OTHER SPECIFIED DISEASE OF ESOPHAGUS: Status: ACTIVE | Noted: 2024-09-17

## 2024-09-17 PROBLEM — K44.9 DIAPHRAGMATIC HERNIA: Status: ACTIVE | Noted: 2022-12-14

## 2024-09-17 PROBLEM — K21.00 GASTRO-ESOPHAGEAL REFLUX DISEASE WITH ESOPHAGITIS, WITHOUT BLEEDING: Status: ACTIVE | Noted: 2024-09-17

## 2024-09-18 ENCOUNTER — OFFICE VISIT (OUTPATIENT)
Dept: PRIMARY CARE | Facility: CLINIC | Age: 69
End: 2024-09-18
Payer: MEDICARE

## 2024-09-18 ENCOUNTER — LAB (OUTPATIENT)
Dept: LAB | Facility: LAB | Age: 69
End: 2024-09-18
Payer: MEDICARE

## 2024-09-18 VITALS
SYSTOLIC BLOOD PRESSURE: 105 MMHG | DIASTOLIC BLOOD PRESSURE: 70 MMHG | HEART RATE: 63 BPM | HEIGHT: 67 IN | WEIGHT: 171 LBS | TEMPERATURE: 97.5 F | OXYGEN SATURATION: 100 % | BODY MASS INDEX: 26.84 KG/M2

## 2024-09-18 DIAGNOSIS — E66.3 OVERWEIGHT (BMI 25.0-29.9): ICD-10-CM

## 2024-09-18 DIAGNOSIS — E53.8 VITAMIN B12 DEFICIENCY: ICD-10-CM

## 2024-09-18 DIAGNOSIS — F41.8 MIXED ANXIETY DEPRESSIVE DISORDER: ICD-10-CM

## 2024-09-18 DIAGNOSIS — E55.9 VITAMIN D DEFICIENCY: ICD-10-CM

## 2024-09-18 DIAGNOSIS — D64.9 ANEMIA, UNSPECIFIED TYPE: ICD-10-CM

## 2024-09-18 DIAGNOSIS — Z00.00 ENCOUNTER FOR MEDICARE ANNUAL WELLNESS EXAM: Primary | ICD-10-CM

## 2024-09-18 LAB
25(OH)D3 SERPL-MCNC: 42 NG/ML (ref 30–100)
IRON SATN MFR SERPL: 31 % (ref 25–45)
IRON SERPL-MCNC: 108 UG/DL (ref 35–150)
TIBC SERPL-MCNC: 344 UG/DL (ref 240–445)
UIBC SERPL-MCNC: 236 UG/DL (ref 110–370)
VIT B12 SERPL-MCNC: 780 PG/ML (ref 211–911)

## 2024-09-18 PROCEDURE — 99215 OFFICE O/P EST HI 40 MIN: CPT | Performed by: INTERNAL MEDICINE

## 2024-09-18 PROCEDURE — 1124F ACP DISCUSS-NO DSCNMKR DOCD: CPT | Performed by: INTERNAL MEDICINE

## 2024-09-18 PROCEDURE — 82607 VITAMIN B-12: CPT

## 2024-09-18 PROCEDURE — 1159F MED LIST DOCD IN RCRD: CPT | Performed by: INTERNAL MEDICINE

## 2024-09-18 PROCEDURE — 1126F AMNT PAIN NOTED NONE PRSNT: CPT | Performed by: INTERNAL MEDICINE

## 2024-09-18 PROCEDURE — 83540 ASSAY OF IRON: CPT

## 2024-09-18 PROCEDURE — 83550 IRON BINDING TEST: CPT

## 2024-09-18 PROCEDURE — G0439 PPPS, SUBSEQ VISIT: HCPCS | Performed by: INTERNAL MEDICINE

## 2024-09-18 PROCEDURE — 82306 VITAMIN D 25 HYDROXY: CPT

## 2024-09-18 PROCEDURE — 3008F BODY MASS INDEX DOCD: CPT | Performed by: INTERNAL MEDICINE

## 2024-09-18 PROCEDURE — 36415 COLL VENOUS BLD VENIPUNCTURE: CPT

## 2024-09-18 RX ORDER — MAGNESIUM GLYCINATE 100 MG
5000 CAPSULE ORAL DAILY
Start: 2024-09-18 | End: 2024-12-17

## 2024-09-18 RX ORDER — BUPROPION HYDROCHLORIDE 150 MG/1
150 TABLET ORAL EVERY MORNING
Qty: 100 TABLET | Refills: 2 | Status: SHIPPED | OUTPATIENT
Start: 2024-09-18

## 2024-09-18 ASSESSMENT — PATIENT HEALTH QUESTIONNAIRE - PHQ9
1. LITTLE INTEREST OR PLEASURE IN DOING THINGS: NOT AT ALL
SUM OF ALL RESPONSES TO PHQ9 QUESTIONS 1 AND 2: 0
2. FEELING DOWN, DEPRESSED OR HOPELESS: NOT AT ALL

## 2024-09-18 ASSESSMENT — PAIN SCALES - GENERAL: PAINLEVEL: 0-NO PAIN

## 2024-09-18 NOTE — PROGRESS NOTES
[facility]: [facility]  MEDICARE WELLNESS EXAM      Krystina Ortez is a 69 y.o. female that is presenting today for Annual Exam.    Assessment/Plan    Diagnoses and all orders for this visit:  Encounter for Medicare annual wellness exam      Stable overall, Discussed BW and /or DI results and answered all questions Updated HM - Vacc   Vitamin D deficiency  -     cholecalciferol, vitD3,/vit K2 (vitamin D3-vitamin K2) 125 mcg (5,000 unit)-100 mcg capsule; Take 5,000 Units by mouth once daily. For 2 months. Then decrease to one capsule on Mon/Wed/Fri  -     Vitamin D 25-Hydroxy,Total (for eval of Vitamin D levels); Future  Mixed anxiety depressive disorder     Under control with current treatment   Continue the same   Rx E-scripted 100 days x 2  -     buPROPion XL (Wellbutrin XL) 150 mg 24 hr tablet; Take 1 tablet (150 mg) by mouth once daily in the morning. Do not crush, chew, or split.  Overweight (BMI 25.0-29.9)  -     semaglutide 0.25 mg or 0.5 mg (2 mg/3 mL) pen injector; Inject 0.25 mg under the skin 1 (one) time per week.  Anemia, unspecified type  -     Iron and TIBC; Future  Vitamin B12 deficiency  -     Vitamin B12; Future  Other orders  -     Follow Up In Primary Care  -     Follow Up In Primary Care; Future  ADVANCED CARE PLANNING  Advanced Care Planning was discussed with patient:  The patient does not have an advanced care plan on file. The patient does not have an active surrogate decision-maker on file.  Encouraged the patient to confirm that Living Will and Healthcare Power of  (HCPoA) are accurate and up to date.  Encouraged the patient to confirm that our office be provided a copy of any documentation in the event that anything changes.    ACTIVITIES OF DAILY LIVING  Basic ADLs:  Bathing: Independent, Dressing: Independent, Toileting: Independent, Transferring: Independent, Continence: Independent, Feeding: Independent.    Instrumental ADLs:  Ability to use phone:  Independent, Shopping: Independent, Cooking: Independent, House-keeping: Independent, Laundry: Independent, Transportation: Independent, Medication Management: Independent, Finance Management: Independent.    Subjective   HPI    - Krystina Ortez 69 y.o. female is here today for her  well/ results/ Refills       - Patient denies any symptoms or concerns at this time.       - patient denies any adverse reactions to or concerns with his/her meds.       - Problem list and medication reconciliation done today.  - V.S. Stable. No changes at this time.  - Encouraged continued diet and exercise modification.     Review of Systems  All pertinent POSITIVES as noted per HPI.  All other systems have been reviewed and are NEGATIVE and /or Noncontributory to this patient current visit or complaint.    Objective   Vitals:    09/18/24 0927   BP: 105/70   Pulse: 63   Temp: 36.4 °C (97.5 °F)   SpO2: 100%      Body mass index is 26.78 kg/m².  Physical Exam  Vitals and nursing note reviewed.   Constitutional:       Appearance: Normal appearance.   HENT:      Head: Normocephalic and atraumatic.      Right Ear: Tympanic membrane, ear canal and external ear normal.      Left Ear: Tympanic membrane, ear canal and external ear normal.      Nose: Nose normal.      Mouth/Throat:      Mouth: Mucous membranes are moist.      Pharynx: Oropharynx is clear.   Eyes:      Extraocular Movements: Extraocular movements intact.      Conjunctiva/sclera: Conjunctivae normal.      Pupils: Pupils are equal, round, and reactive to light.   Neck:      Vascular: No carotid bruit.   Cardiovascular:      Rate and Rhythm: Normal rate and regular rhythm.      Pulses: Normal pulses.      Heart sounds: Normal heart sounds.   Pulmonary:      Effort: Pulmonary effort is normal.      Breath sounds: Normal breath sounds.   Abdominal:      General: Abdomen is flat. Bowel sounds are normal.      Palpations: Abdomen is soft.   Musculoskeletal:         General: No  "swelling. Normal range of motion.      Cervical back: Normal range of motion and neck supple.   Lymphadenopathy:      Cervical: No cervical adenopathy.   Skin:     General: Skin is warm and dry.   Neurological:      General: No focal deficit present.      Mental Status: She is alert and oriented to person, place, and time. Mental status is at baseline.   Psychiatric:         Mood and Affect: Mood normal.         Behavior: Behavior normal.   Diagnostic Results   Lab Results   Component Value Date    GLUCOSE 91 09/06/2024    CALCIUM 9.3 09/06/2024     09/06/2024    K 4.4 09/06/2024    CO2 26 09/06/2024     (H) 09/06/2024    BUN 11 09/06/2024    CREATININE 1.00 09/06/2024     Lab Results   Component Value Date    ALT 29 09/06/2024    AST 19 09/06/2024    ALKPHOS 94 09/06/2024    BILITOT 1.2 09/06/2024     Lab Results   Component Value Date    WBC 3.7 (L) 09/06/2024    HGB 12.5 09/06/2024    HCT 35.3 (L) 09/06/2024    MCV 84 09/06/2024     09/06/2024     Lab Results   Component Value Date    CHOL 154 09/06/2024    CHOL 147 09/06/2023    CHOL 140 09/01/2022     Lab Results   Component Value Date    HDL 87.0 09/06/2024    HDL 58 09/06/2023    HDL 50 (L) 09/01/2022     Lab Results   Component Value Date    LDLCALC 50 (L) 09/06/2024    LDLCALC 75 09/06/2023    LDLCALC 69 09/01/2022     Lab Results   Component Value Date    TRIG 86 09/06/2024    TRIG 71 09/06/2023    TRIG 106 09/01/2022     No components found for: \"CHOLHDL\"  Lab Results   Component Value Date    HGBA1C 4.4 09/06/2024     Other labs not included in the list above reviewed either before or during this encounter.    History   Past Medical History:   Diagnosis Date    Dysphagia     Hemorrhoids     Hiatal hernia     Sleep apnea      Past Surgical History:   Procedure Laterality Date    CHOLECYSTECTOMY      GASTRIC BYPASS  06/21/2023    laparoscopic herrera en y gastric bypass, hiatal hernia repair enterolysis (Jordy-Jay)    OTHER SURGICAL HISTORY  "     breast implants    TONSILLECTOMY       Family History   Problem Relation Name Age of Onset    Hypertension Mother      Other (heat stroke) Father      GI problems Father      Hypertension Sister      Lung disease Sister      Mental illness Sister      Other (nodules of vocal cords) Sister       Social History     Socioeconomic History    Marital status:      Spouse name: Not on file    Number of children: Not on file    Years of education: Not on file    Highest education level: Not on file   Occupational History    Not on file   Tobacco Use    Smoking status: Never     Passive exposure: Past (Father)    Smokeless tobacco: Never   Vaping Use    Vaping status: Never Used   Substance and Sexual Activity    Alcohol use: Yes    Drug use: Not Currently    Sexual activity: Not on file   Other Topics Concern    Not on file   Social History Narrative    Not on file     Social Determinants of Health     Financial Resource Strain: Not on file   Food Insecurity: Not on file   Transportation Needs: Not on file   Physical Activity: Not on file   Stress: Not on file   Social Connections: Not on file   Intimate Partner Violence: Not on file   Housing Stability: Not on file     No Known Allergies  Current Outpatient Medications on File Prior to Visit   Medication Sig Dispense Refill    acetaminophen (Tylenol) 500 mg tablet Take 2 tablets (1,000 mg) by mouth once daily.      buPROPion XL (Wellbutrin XL) 150 mg 24 hr tablet TAKE ONE TABLET BY MOUTH ONCE A DAY IN THE MORNING 90 tablet 1    calcium citrate (Calcitrate) 200 mg (950 mg) tablet Take 1 tablet (200 mg) by mouth once daily.      cholecalciferol (Vitamin D-3) 5,000 Units tablet Take 1 tablet (5,000 Units) by mouth once daily.      cyanocobalamin, vitamin B-12, 3,000 mcg capsule as directed Sublingual      multivitamin tablet Take 1 tablet by mouth once daily. Woman mvi      NON FORMULARY X39 Patch 1 a day 12 hours wearing      zinc sulfate (ZINC-15 ORAL) Take by  mouth.      [DISCONTINUED] naproxen (Naprosyn) 500 mg tablet Take 1 tablet (500 mg) by mouth 2 times daily (morning and late afternoon).      [DISCONTINUED] Ozempic 0.25 mg or 0.5 mg (2 mg/3 mL) pen injector Inject 0.5 mg under the skin 1 (one) time per week.       No current facility-administered medications on file prior to visit.     Immunization History   Administered Date(s) Administered    Flu vaccine, quadrivalent, high-dose, preservative free, age 65y+ (FLUZONE) 09/12/2023    Influenza, trivalent, adjuvanted 09/06/2024    Pfizer COVID-19 vaccine, 12 years and older, (30mcg/0.3mL) (Comirnaty) 10/10/2023, 09/06/2024    Pfizer COVID-19 vaccine, bivalent, age 12 years and older (30 mcg/0.3 mL) 10/04/2022    Pfizer Purple Cap SARS-CoV-2 03/14/2021, 04/04/2021, 11/16/2021    Pneumococcal conjugate vaccine, 20-valent (PREVNAR 20) 08/16/2022    Tdap vaccine, age 7 year and older (BOOSTRIX, ADACEL) 08/16/2022     Patient's medical history was reviewed and updated either before or during this encounter.     Satnam Martel MD

## 2024-11-19 ENCOUNTER — TELEPHONE (OUTPATIENT)
Dept: PRIMARY CARE | Facility: CLINIC | Age: 69
End: 2024-11-19
Payer: MEDICARE

## 2024-11-19 NOTE — TELEPHONE ENCOUNTER
LV 9/18/24, NV 3/26/25    Ozempic 2 mg/3 ml pen injector  -  pt tolerating well and asking if can increase to the next dose.    Kin Gomez [address] [place] [address]

## 2024-11-21 DIAGNOSIS — E66.3 OVERWEIGHT (BMI 25.0-29.9): ICD-10-CM

## 2024-12-10 ENCOUNTER — APPOINTMENT (OUTPATIENT)
Dept: SURGERY | Facility: CLINIC | Age: 69
End: 2024-12-10
Payer: MEDICARE

## 2025-01-09 ENCOUNTER — TELEPHONE (OUTPATIENT)
Dept: PHARMACY | Facility: HOSPITAL | Age: 70
End: 2025-01-09
Payer: MEDICARE

## 2025-01-09 NOTE — TELEPHONE ENCOUNTER
I reviewed the progress note and agree with the resident’s findings and plans as written. Case discussed with resident.    Meenu Breen, BrianD

## 2025-01-09 NOTE — TELEPHONE ENCOUNTER
Population Health: Outreach by Ambulatory Pharmacy Team    Patient: Krystina Ortez  Primary Care Provider (PCP): Satnam Martel MD  Payor: Emily CARROLL  Reason: Adherence  Medication(s): Ozempic 2mg/3mL injection  Outcome: Patient Reached: Claims Adherence, patient reports compliance to the med and denies having issues picking up the med. Patient requests sending refill request to the prescriber.    Refill request is sent to the prescriber.    Antwan Ortega RPh

## 2025-01-15 ENCOUNTER — APPOINTMENT (OUTPATIENT)
Dept: OBSTETRICS AND GYNECOLOGY | Facility: CLINIC | Age: 70
End: 2025-01-15
Payer: MEDICARE

## 2025-01-15 VITALS
WEIGHT: 172 LBS | DIASTOLIC BLOOD PRESSURE: 70 MMHG | SYSTOLIC BLOOD PRESSURE: 110 MMHG | HEIGHT: 67 IN | BODY MASS INDEX: 27 KG/M2

## 2025-01-15 DIAGNOSIS — N95.0 POSTMENOPAUSAL BLEEDING: Primary | ICD-10-CM

## 2025-01-15 PROCEDURE — 3008F BODY MASS INDEX DOCD: CPT | Performed by: OBSTETRICS & GYNECOLOGY

## 2025-01-15 PROCEDURE — 1159F MED LIST DOCD IN RCRD: CPT | Performed by: OBSTETRICS & GYNECOLOGY

## 2025-01-15 PROCEDURE — 99204 OFFICE O/P NEW MOD 45 MIN: CPT | Performed by: OBSTETRICS & GYNECOLOGY

## 2025-01-15 NOTE — PROGRESS NOTES
Subjective   Patient ID: Krystina Ortez is a 69 y.o. female who presents for Consult.  New patient 69 years old.  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  She has moved back from [place] as she is retired  unfortunate lost her father and is taking care of her sister who has some psychiatric issues as well as her 89-year-old mother.  No pregnancies.  Past surgeries do include a Elisha-en-Y done in the summer 2023.  She is currently on Ozempic and Wellbutrin.  Has lost 70 pounds.    She did notice some mucousy blood just before [place].  Just 1 episode.  Comes in for workup.  Previous Paps have all been normal.    Will initiate workup with pelvic ultrasound and then depending on the findings can decide on whether endometrial sampling is appropriate.  Important to rule out any kind of pelvic pathology though the most likely cause is often atrophy from menopausal status        Review of Systems   All other systems reviewed and are negative.      Objective   Physical Exam  Constitutional:       Appearance: Normal appearance. She is normal weight.   Neurological:      Mental Status: She is alert.         Assessment/Plan   New onset postmenopausal bleeding.  69 years old.  Retired OB/GYN.  Menopause was approximately 10 years ago.  Has lost 70 pounds on Ozempic and Wellbutrin.  Elisha-en-Y procedure summer 2023.  Initiate workup with pelvic ultrasound.  Follow-up to review results and recommendations         Bradley Garcia MD 01/15/25 1:48 PM

## 2025-01-16 DIAGNOSIS — E66.3 OVERWEIGHT (BMI 25.0-29.9): ICD-10-CM

## 2025-01-17 ENCOUNTER — HOSPITAL ENCOUNTER (OUTPATIENT)
Dept: RADIOLOGY | Facility: CLINIC | Age: 70
Discharge: HOME | End: 2025-01-17
Payer: MEDICARE

## 2025-01-17 DIAGNOSIS — N95.0 POSTMENOPAUSAL BLEEDING: ICD-10-CM

## 2025-01-17 PROCEDURE — 76856 US EXAM PELVIC COMPLETE: CPT

## 2025-02-11 ENCOUNTER — TELEPHONE (OUTPATIENT)
Dept: PRIMARY CARE | Facility: CLINIC | Age: 70
End: 2025-02-11
Payer: MEDICARE

## 2025-02-11 NOTE — TELEPHONE ENCOUNTER
Pharm initiated PA on semaglutide (OZEMPIC) 1 mg/dose (4 mg/3 mL) pen injector.  KEY: [id number].  Please help.

## 2025-02-12 ENCOUNTER — APPOINTMENT (OUTPATIENT)
Dept: OBSTETRICS AND GYNECOLOGY | Facility: CLINIC | Age: 70
End: 2025-02-12
Payer: MEDICARE

## 2025-02-19 ENCOUNTER — APPOINTMENT (OUTPATIENT)
Dept: OBSTETRICS AND GYNECOLOGY | Facility: CLINIC | Age: 70
End: 2025-02-19
Payer: MEDICARE

## 2025-03-26 ENCOUNTER — APPOINTMENT (OUTPATIENT)
Dept: PRIMARY CARE | Facility: CLINIC | Age: 70
End: 2025-03-26
Payer: MEDICARE

## 2025-03-28 ENCOUNTER — APPOINTMENT (OUTPATIENT)
Dept: PRIMARY CARE | Facility: CLINIC | Age: 70
End: 2025-03-28

## 2025-04-07 ENCOUNTER — APPOINTMENT (OUTPATIENT)
Dept: PRIMARY CARE | Facility: CLINIC | Age: 70
End: 2025-04-07

## 2025-08-14 DIAGNOSIS — F41.8 MIXED ANXIETY DEPRESSIVE DISORDER: ICD-10-CM

## 2025-08-17 RX ORDER — BUPROPION HYDROCHLORIDE 150 MG/1
150 TABLET ORAL EVERY MORNING
Qty: 100 TABLET | Refills: 0 | Status: SHIPPED | OUTPATIENT
Start: 2025-08-17